# Patient Record
Sex: FEMALE | Race: WHITE | Employment: FULL TIME | URBAN - METROPOLITAN AREA
[De-identification: names, ages, dates, MRNs, and addresses within clinical notes are randomized per-mention and may not be internally consistent; named-entity substitution may affect disease eponyms.]

---

## 2019-04-02 ENCOUNTER — HOSPITAL ENCOUNTER (EMERGENCY)
Facility: CLINIC | Age: 49
Discharge: HOME | End: 2019-04-02
Attending: EMERGENCY MEDICINE | Admitting: EMERGENCY MEDICINE
Payer: COMMERCIAL

## 2019-04-02 ENCOUNTER — APPOINTMENT (OUTPATIENT)
Dept: CT IMAGING | Facility: CLINIC | Age: 49
End: 2019-04-02
Attending: EMERGENCY MEDICINE
Payer: COMMERCIAL

## 2019-04-02 VITALS
DIASTOLIC BLOOD PRESSURE: 69 MMHG | SYSTOLIC BLOOD PRESSURE: 112 MMHG | RESPIRATION RATE: 18 BRPM | BODY MASS INDEX: 23.9 KG/M2 | WEIGHT: 140 LBS | HEART RATE: 84 BPM | TEMPERATURE: 98.1 F | HEIGHT: 64 IN | OXYGEN SATURATION: 99 %

## 2019-04-02 DIAGNOSIS — N20.1 URETERAL STONE: ICD-10-CM

## 2019-04-02 LAB
ALBUMIN SERPL-MCNC: 4.1 G/DL (ref 3.4–5)
ALBUMIN UR-MCNC: 30 MG/DL
ALP SERPL-CCNC: 63 U/L (ref 40–150)
ALT SERPL W P-5'-P-CCNC: 13 U/L (ref 0–50)
ANION GAP SERPL CALCULATED.3IONS-SCNC: 7 MMOL/L (ref 3–14)
APPEARANCE UR: ABNORMAL
AST SERPL W P-5'-P-CCNC: 15 U/L (ref 0–45)
BASOPHILS # BLD AUTO: 0 10E9/L (ref 0–0.2)
BASOPHILS NFR BLD AUTO: 0.2 %
BILIRUB SERPL-MCNC: 0.3 MG/DL (ref 0.2–1.3)
BILIRUB UR QL STRIP: NEGATIVE
BUN SERPL-MCNC: 13 MG/DL (ref 7–30)
CALCIUM SERPL-MCNC: 8.9 MG/DL (ref 8.5–10.1)
CHLORIDE SERPL-SCNC: 104 MMOL/L (ref 94–109)
CO2 SERPL-SCNC: 25 MMOL/L (ref 20–32)
COLOR UR AUTO: YELLOW
CREAT SERPL-MCNC: 0.69 MG/DL (ref 0.52–1.04)
DIFFERENTIAL METHOD BLD: ABNORMAL
EOSINOPHIL # BLD AUTO: 0 10E9/L (ref 0–0.7)
EOSINOPHIL NFR BLD AUTO: 0.1 %
ERYTHROCYTE [DISTWIDTH] IN BLOOD BY AUTOMATED COUNT: 12.7 % (ref 10–15)
GFR SERPL CREATININE-BSD FRML MDRD: >90 ML/MIN/{1.73_M2}
GLUCOSE SERPL-MCNC: 116 MG/DL (ref 70–99)
GLUCOSE UR STRIP-MCNC: NEGATIVE MG/DL
HCG SERPL QL: NEGATIVE
HCT VFR BLD AUTO: 39.7 % (ref 35–47)
HGB BLD-MCNC: 13.2 G/DL (ref 11.7–15.7)
HGB UR QL STRIP: ABNORMAL
IMM GRANULOCYTES # BLD: 0 10E9/L (ref 0–0.4)
IMM GRANULOCYTES NFR BLD: 0.2 %
KETONES UR STRIP-MCNC: 10 MG/DL
LEUKOCYTE ESTERASE UR QL STRIP: ABNORMAL
LIPASE SERPL-CCNC: 159 U/L (ref 73–393)
LYMPHOCYTES # BLD AUTO: 1.1 10E9/L (ref 0.8–5.3)
LYMPHOCYTES NFR BLD AUTO: 8.4 %
MCH RBC QN AUTO: 27.6 PG (ref 26.5–33)
MCHC RBC AUTO-ENTMCNC: 33.2 G/DL (ref 31.5–36.5)
MCV RBC AUTO: 83 FL (ref 78–100)
MONOCYTES # BLD AUTO: 0.3 10E9/L (ref 0–1.3)
MONOCYTES NFR BLD AUTO: 2.7 %
MUCOUS THREADS #/AREA URNS LPF: PRESENT /LPF
NEUTROPHILS # BLD AUTO: 11.1 10E9/L (ref 1.6–8.3)
NEUTROPHILS NFR BLD AUTO: 88.4 %
NITRATE UR QL: NEGATIVE
NRBC # BLD AUTO: 0 10*3/UL
NRBC BLD AUTO-RTO: 0 /100
PH UR STRIP: 7.5 PH (ref 5–7)
PLATELET # BLD AUTO: 343 10E9/L (ref 150–450)
POTASSIUM SERPL-SCNC: 3.3 MMOL/L (ref 3.4–5.3)
PROT SERPL-MCNC: 7.6 G/DL (ref 6.8–8.8)
RBC # BLD AUTO: 4.79 10E12/L (ref 3.8–5.2)
RBC #/AREA URNS AUTO: >182 /HPF (ref 0–2)
SODIUM SERPL-SCNC: 136 MMOL/L (ref 133–144)
SOURCE: ABNORMAL
SP GR UR STRIP: 1.02 (ref 1–1.03)
SQUAMOUS #/AREA URNS AUTO: 2 /HPF (ref 0–1)
UROBILINOGEN UR STRIP-MCNC: NORMAL MG/DL (ref 0–2)
WBC # BLD AUTO: 12.6 10E9/L (ref 4–11)
WBC #/AREA URNS AUTO: 21 /HPF (ref 0–5)

## 2019-04-02 PROCEDURE — 81001 URINALYSIS AUTO W/SCOPE: CPT | Performed by: EMERGENCY MEDICINE

## 2019-04-02 PROCEDURE — 83690 ASSAY OF LIPASE: CPT | Performed by: EMERGENCY MEDICINE

## 2019-04-02 PROCEDURE — 85025 COMPLETE CBC W/AUTO DIFF WBC: CPT | Performed by: EMERGENCY MEDICINE

## 2019-04-02 PROCEDURE — 96361 HYDRATE IV INFUSION ADD-ON: CPT

## 2019-04-02 PROCEDURE — 96374 THER/PROPH/DIAG INJ IV PUSH: CPT

## 2019-04-02 PROCEDURE — 25000128 H RX IP 250 OP 636: Performed by: EMERGENCY MEDICINE

## 2019-04-02 PROCEDURE — 80053 COMPREHEN METABOLIC PANEL: CPT | Performed by: EMERGENCY MEDICINE

## 2019-04-02 PROCEDURE — 84703 CHORIONIC GONADOTROPIN ASSAY: CPT | Performed by: EMERGENCY MEDICINE

## 2019-04-02 PROCEDURE — 74176 CT ABD & PELVIS W/O CONTRAST: CPT

## 2019-04-02 PROCEDURE — 96376 TX/PRO/DX INJ SAME DRUG ADON: CPT

## 2019-04-02 PROCEDURE — 96375 TX/PRO/DX INJ NEW DRUG ADDON: CPT

## 2019-04-02 PROCEDURE — 99285 EMERGENCY DEPT VISIT HI MDM: CPT | Mod: 25

## 2019-04-02 RX ORDER — ONDANSETRON 2 MG/ML
4 INJECTION INTRAMUSCULAR; INTRAVENOUS EVERY 30 MIN PRN
Status: DISCONTINUED | OUTPATIENT
Start: 2019-04-02 | End: 2019-04-02 | Stop reason: HOSPADM

## 2019-04-02 RX ORDER — MORPHINE SULFATE 4 MG/ML
4 INJECTION, SOLUTION INTRAMUSCULAR; INTRAVENOUS
Status: DISCONTINUED | OUTPATIENT
Start: 2019-04-02 | End: 2019-04-02 | Stop reason: HOSPADM

## 2019-04-02 RX ORDER — OXYCODONE HYDROCHLORIDE 5 MG/1
5-10 TABLET ORAL EVERY 6 HOURS PRN
Qty: 20 TABLET | Refills: 0 | Status: SHIPPED | OUTPATIENT
Start: 2019-04-02 | End: 2019-05-17

## 2019-04-02 RX ORDER — SENNA AND DOCUSATE SODIUM 50; 8.6 MG/1; MG/1
1-2 TABLET, FILM COATED ORAL 2 TIMES DAILY PRN
Qty: 30 TABLET | Refills: 0 | Status: SHIPPED | OUTPATIENT
Start: 2019-04-02 | End: 2019-05-17

## 2019-04-02 RX ORDER — ONDANSETRON 4 MG/1
4 TABLET, ORALLY DISINTEGRATING ORAL EVERY 6 HOURS PRN
Qty: 20 TABLET | Refills: 0 | Status: SHIPPED | OUTPATIENT
Start: 2019-04-02 | End: 2019-05-17

## 2019-04-02 RX ORDER — KETOROLAC TROMETHAMINE 15 MG/ML
15 INJECTION, SOLUTION INTRAMUSCULAR; INTRAVENOUS ONCE
Status: COMPLETED | OUTPATIENT
Start: 2019-04-02 | End: 2019-04-02

## 2019-04-02 RX ORDER — TAMSULOSIN HYDROCHLORIDE 0.4 MG/1
0.4 CAPSULE ORAL DAILY
Qty: 10 CAPSULE | Refills: 0 | Status: SHIPPED | OUTPATIENT
Start: 2019-04-02 | End: 2019-04-05

## 2019-04-02 RX ADMIN — MORPHINE SULFATE 4 MG: 4 INJECTION INTRAVENOUS at 14:33

## 2019-04-02 RX ADMIN — ONDANSETRON 4 MG: 2 INJECTION INTRAMUSCULAR; INTRAVENOUS at 14:31

## 2019-04-02 RX ADMIN — SODIUM CHLORIDE 1000 ML: 9 INJECTION, SOLUTION INTRAVENOUS at 13:09

## 2019-04-02 RX ADMIN — KETOROLAC TROMETHAMINE 15 MG: 15 INJECTION, SOLUTION INTRAMUSCULAR; INTRAVENOUS at 13:10

## 2019-04-02 RX ADMIN — ONDANSETRON 4 MG: 2 INJECTION INTRAMUSCULAR; INTRAVENOUS at 13:43

## 2019-04-02 ASSESSMENT — ENCOUNTER SYMPTOMS
VOMITING: 0
SHORTNESS OF BREATH: 0
CHILLS: 0
BACK PAIN: 1
ABDOMINAL PAIN: 1
NAUSEA: 0
FREQUENCY: 0
FEVER: 0
CONSTIPATION: 0
HEMATURIA: 0
DIARRHEA: 1
DYSURIA: 0
DIFFICULTY URINATING: 0

## 2019-04-02 ASSESSMENT — MIFFLIN-ST. JEOR: SCORE: 1260.04

## 2019-04-02 NOTE — ED AVS SNAPSHOT
Emergency Department  [address] [facility] [address]  Phone:  [phone]  Fax:  [phone]                                    Mel Reeves   MRN: [id number]    Department:   Emergency Department   Date of Visit:  4/2/2019           After Visit Summary Signature Page    I have received my discharge instructions, and my questions have been answered. I have discussed any challenges I see with this plan with the nurse or doctor.    ..........................................................................................................................................  Patient/Patient Representative Signature      ..........................................................................................................................................  Patient Representative Print Name and Relationship to Patient    ..................................................               ................................................  Date                                   Time    ..........................................................................................................................................  Reviewed by Signature/Title    ...................................................              ..............................................  Date                                               Time          22EPIC Rev 08/18

## 2019-04-02 NOTE — DISCHARGE INSTRUCTIONS
1. Please take flomax as prescribed.  2. Take ibuprofen 600 to 800 mg by mouth every 6 to 8 hours as needed for pain or fever  3. Take acetaminophen 500 to 1000 mg by mouth every 4 to 6 hours as needed for pain or fever.  Do not take more than 4000 mg in 24 hours.  Do not take within 6 hours of another acetaminophen containing medication such as norco (vicodin) or percocet.  4. Take oxycodone as prescribed as needed for breakthrough pain.  5. Please follow-up with your primary care doctor or Urology as needed.  6. Please return to the ED as needed for new or worsening symptoms such as fever greater than 100.4 F, severe and uncontrollable pain vomiting and unable to keep anything down, any other worrisome symptoms.    Discharge Instructions  Kidney Stones    Kidney stones are a common problem that can cause a lot of pain but fortunately are usually not dangerous. Kidney stones form in the kidney and then can cause a blockage (obstruction) of the flow of urine from the kidney which leads to pain. Most patients can manage kidney stones at home (without a hospital stay).  However, sometimes your condition may be worse than it seemed at first, or may get worse with time. Most kidney stones will pass on their own, but occasionally stones may need to be removed by an urologist.    Generally, every Emergency Department visit should have a follow-up clinic visit with either a primary or a specialty clinic/provider. Please follow-up as instructed by your emergency provider today.      Return to the Emergency Department if:  Your pain is not controlled despite the medications provided or recommended.  You are vomiting (throwing up) and cannot keep fluids or medications down.  You develop a fever (>100.4 F).  You feel much more ill or develop new symptoms.  What can I do to help myself?  Be sure to drink plenty of fluids.  If instructed to do so, strain your urine (pee) with the urine strainer you were provided with today. Your  stone may look like a grain of sand or a small pebble. Collect any stones in the cup provided and bring to your follow-up appointment.  Staying active is good, and may help the stone to pass. You may do whatever you feel up to doing without restrictions.   Treatment:  Non-steroidal anti-inflammatory drugs (NSAIDs). This includes prescription medicines like Toradol  (ketorolac) and non-prescription medicines like Advil  (ibuprofen) and Nuprin  (ibuprofen) and Naproxen. These pain relievers are very effective for kidney stones.  Nausea (sick to your stomach) medication.  Nausea and vomiting are common with kidney stones, so your provider may send you home with medicine for this.   Flomax  (tamsulosin). This medicine is sometimes used for men with prostate problems, but also can help kidney stones to pass. Its effectiveness is controversial or questionable so it is prescribed in certain situations. This medicine can lower blood pressure, and you may feel faint/lightheaded, especially when you first stand up. Be sure to get up gradually, sit down if you feel faint, and avoid activity where feeling faint would be dangerous, such as climbing ladders.  If you were given a prescription for medicine here today, be sure to read all of the information (including the package insert) that comes with your prescription.  This will include important information about the medicine, its side effects, and any warnings that you need to know about.  The pharmacist who fills the prescription can provide more information and answer questions you may have about the medicine.  If you have questions or concerns that the pharmacist cannot address, please call or return to the Emergency Department.   Remember that you can always come back to the Emergency Department if you are not able to see your regular provider in the amount of time listed above, if you get any new symptoms, or if there is anything that worries you.

## 2019-04-02 NOTE — ED PROVIDER NOTES
History     Chief Complaint:  Abdominal pain    HPI   Mel Reeves is a 48 year old female status post appendectomy with a history of kidney stones and ovarian cysts who presents with right lower quadrant abdominal pain. The patient states that over the past several days she has been experiencing a very mild discomfort in her right lower quadrant of her abdomen. She has had some diarrhea, around ~1-2 bouts a day, but otherwise has been eating well without problems. Today, however, around 0900 the patient states this pain got suddenly and significantly worse. The pain likewise seemed to be wrapping around into her right lower back as well. The patient attempted hot packs as well as 800 mg Ibuprofen but given the lack of improvement over the past several hours she presents here for evaluation. The patient notes that since arrival, her pain has been improving but is still worse than it has been over the past several days. She has not had any black/bloody stools. She otherwise denies fevers, chills, urinary changes, hematuria, vaginal bleeding/pain, chest pain, shortness of breath. She has not had any recent travel nor antibiotic use.     LMP: 10 days ago    Allergies:  Sulfa Drugs      Medications:    The patient is currently on no regular medications.     Past Medical History:    Ovarian cyst  Kidney stone  Osteogenesis imperfecta     Past Surgical History:    Appendectomy    Family History:    History reviewed. No pertinent family history.      Social History:  Smoking Status: Never Smoker  Alcohol Use: Rarely  Patient presents with , Jayesh.      Review of Systems   Constitutional: Negative for chills and fever.   Respiratory: Negative for shortness of breath.    Cardiovascular: Negative for chest pain.   Gastrointestinal: Positive for abdominal pain and diarrhea. Negative for constipation, nausea and vomiting.   Genitourinary: Negative for difficulty urinating, dysuria, frequency, hematuria, vaginal  "bleeding and vaginal pain.   Musculoskeletal: Positive for back pain.   All other systems reviewed and are negative.      Physical Exam     Patient Vitals for the past 24 hrs:   BP Temp Pulse Heart Rate Resp SpO2 Height Weight   04/02/19 1500 -- -- -- 85 18 99 % -- --   04/02/19 1430 112/69 -- 84 -- 16 99 % -- --   04/02/19 1222 130/64 98.1  F (36.7  C) 109 -- 16 100 % 1.626 m (5' 4\") 63.5 kg (140 lb)        Physical Exam  Constitutional: Well developed, nontox appearance, appears somewhat uncomfortable   Head: Atraumatic.   Mouth/Throat: Oropharynx is clear and moist.   Neck:  no stridor  Eyes: no scleral icterus  Cardiovascular: RRR, 2+ bilat radial, DP pulses  Pulmonary/Chest: nml resp effort, Clear BS bilat  Abdominal: ND, +BS, soft, NT, no rebound or guarding   : R CVA tenderness  Ext: Warm, well perfused, no edema  Neurological: A&O, symmetric facies, moves ext x4  Skin: Skin is warm and dry.   Psychiatric: Behavior is normal. Thought content normal.   Nursing note and vitals reviewed.        Emergency Department Course     Imaging:  Radiographic findings were communicated with the patient who voiced understanding of the findings.    CT-scan Abdomen/Pelvis w/o contrast:  1. 6 x 4 mm obstructing calculus distal right ureter with hydroureter  and hydronephrosis.  2. Multiple nonobstructing calculi left renal collecting system,  including a staghorn calculus.  Preliminary result per radiology.      Laboratory:  CBC: WBC 12.6 (H), o/w WNL (HGB 13.2, )   CMP: K 3.3 (L), Glucose 116 (H), o/w WNL (Creatinine 0.69)   Lipase: 159  UA: Ketone 10, Large blood, pH 7.5 (H), Albumin 30, leukocyte esterase large, WBC 21 (H), RBC >182 (H), Squamous Epithelial 2 (H), mucous present, o/w negative  HCG: Negative    Interventions:  1310 - Toradol 15 mg IV  1343 - Zofran 4mg IV injection    1431 - Zofran 4mg IV injection    1433 - Morphine 4 mg IV    Emergency Department Course:  Past medical records, nursing notes, and " vitals reviewed.  1252: I performed an exam of the patient and obtained history, as documented above.     IV inserted and blood drawn.    The patient was sent for a CT-scan while in the emergency department, findings above.      1425: I rechecked the patient. Findings and plan explained to the Patient. Patient discharged home with instructions regarding supportive care, medications, and reasons to return. The importance of close follow-up was reviewed.      Impression & Plan      Medical Decision Making:  Mel Reeves is a 48 year old female status post appendectomy with a history of ovarian cysts and kidney stones who presents with 3 days of right lower quadrant and back pain A broad differential diagnosis was considered including diverticulitis, ureterolithiasis, ulcer, hydronephrosis, pneumonia, rib fracture, UTI, pyelonephritis, ectopic, ovarian cyst or torsion and pregnancy amongst many other etiologies. I did pursue a work up as noted above with CT-scan and basic blood work, as well as UA. CT-scan confirms the presence of a 6 x 4 mm obstructing calculus distal right ureter with hydroureter and hydronephrosis.  Minimal leukocytosis and UA consistent with inflammatory changes and bleeding 2ndary to stone.  Doubt infected ureteral stone.  Doubt other aforementioned possible etiologies given diagnosis of ureteral stone.   Patient is hemodynamically stable in ED. Pain successfully managed.  Discussed attempt to pass stone without invasive procedure even though the stone is larger than 5 mm which the pt is agreeable to. Rx written as noted below for outpt mgmt.  At this time I feel the patient is safe for discharge.  Recommendations given regarding follow up with urology and return to the emergency department as needed for new or worsening symptoms.  Counseled on all results, diagnosis and disposition.  Pt and  understanding and agreeable to plan. Patient discharged in improved condition.        Diagnosis:     ICD-10-CM    1. Ureteral stone N20.1      Discharge Medications:     ondansetron 4 MG ODT tab  Commonly known as:  ZOFRAN ODT  4 mg, Oral, EVERY 6 HOURS PRN     oxyCODONE 5 MG tablet  Commonly known as:  ROXICODONE  5-10 mg, Oral, EVERY 6 HOURS PRN     SENNA-docusate sodium 8.6-50 MG tablet  Commonly known as:  SENNA S  1-2 tablets, Oral, 2 TIMES DAILY PRN     tamsulosin 0.4 MG capsule  Commonly known as:  FLOMAX  0.4 mg, Oral, DAILY            Jarod Luciano  4/2/2019    EMERGENCY DEPARTMENT  Jarod MIRZA, bev serving as a scribe at 12:52 PM on 4/2/2019 to document services personally performed by Kashmir Henao MD based on my observations and the provider's statements to me.       Kashmir Henao MD  04/03/19 0955

## 2019-04-04 DIAGNOSIS — N20.1 URETERAL STONE: Primary | ICD-10-CM

## 2019-04-05 ENCOUNTER — OFFICE VISIT (OUTPATIENT)
Dept: UROLOGY | Facility: CLINIC | Age: 49
End: 2019-04-05
Payer: COMMERCIAL

## 2019-04-05 VITALS
SYSTOLIC BLOOD PRESSURE: 116 MMHG | HEART RATE: 90 BPM | HEIGHT: 64 IN | BODY MASS INDEX: 23.9 KG/M2 | OXYGEN SATURATION: 100 % | DIASTOLIC BLOOD PRESSURE: 86 MMHG | WEIGHT: 140 LBS

## 2019-04-05 DIAGNOSIS — N20.1 URETERAL STONE: ICD-10-CM

## 2019-04-05 DIAGNOSIS — N20.1 RIGHT URETERAL STONE: Primary | ICD-10-CM

## 2019-04-05 DIAGNOSIS — N20.0 STAGHORN RENAL CALCULUS: ICD-10-CM

## 2019-04-05 LAB
ALBUMIN UR-MCNC: NEGATIVE MG/DL
APPEARANCE UR: CLEAR
BILIRUB UR QL STRIP: NEGATIVE
COLOR UR AUTO: YELLOW
GLUCOSE UR STRIP-MCNC: NEGATIVE MG/DL
HGB UR QL STRIP: ABNORMAL
KETONES UR STRIP-MCNC: NEGATIVE MG/DL
LEUKOCYTE ESTERASE UR QL STRIP: ABNORMAL
NITRATE UR QL: NEGATIVE
PH UR STRIP: 7 PH (ref 5–7)
SOURCE: ABNORMAL
SP GR UR STRIP: <=1.005 (ref 1–1.03)
UROBILINOGEN UR STRIP-ACNC: 0.2 EU/DL (ref 0.2–1)

## 2019-04-05 PROCEDURE — 99204 OFFICE O/P NEW MOD 45 MIN: CPT | Performed by: UROLOGY

## 2019-04-05 PROCEDURE — 81003 URINALYSIS AUTO W/O SCOPE: CPT | Performed by: UROLOGY

## 2019-04-05 RX ORDER — TAMSULOSIN HYDROCHLORIDE 0.4 MG/1
0.4 CAPSULE ORAL DAILY
Qty: 30 CAPSULE | Refills: 0 | Status: SHIPPED | OUTPATIENT
Start: 2019-04-05 | End: 2019-05-09

## 2019-04-05 ASSESSMENT — PAIN SCALES - GENERAL: PAINLEVEL: MILD PAIN (2)

## 2019-04-05 ASSESSMENT — MIFFLIN-ST. JEOR: SCORE: 1260.04

## 2019-04-05 NOTE — NURSING NOTE
"Chief Complaint   Patient presents with     Consult For     Ureteral stone     Initial /86 (BP Location: Left arm, Patient Position: Sitting, Cuff Size: Adult Regular)   Pulse 90   Ht 1.626 m (5' 4\")   Wt 63.5 kg (140 lb)   LMP 03/24/2019 (Approximate)   SpO2 100%   BMI 24.03 kg/m   Estimated body mass index is 24.03 kg/m  as calculated from the following:    Height as of this encounter: 1.626 m (5' 4\").    Weight as of this encounter: 63.5 kg (140 lb).  BP completed using cuff size:regular-left.    Abram BORJAS  [facility] April 5, 2019 4:09 PM  "

## 2019-04-05 NOTE — LETTER
4/5/2019       RE: Mel Reeves  [address] [facility] [address]     Dear Colleague,    Thank you for referring your patient, Mel Reeves, to the [facility] [place] [facility]. Please see a copy of my visit note below.    Urology Consult History and Physical  [place]  Name: Mel Reeves    MRN: [id number]   YOB: 1970       We were asked to see Mel Reeves at the request of ER follow up for evaluation and treatment of RIGHT ureteral stone and LEFT staghorn kidney stone.        Chief Complaint:   RIGHT ureteral stone and LEFT staghorn kidney stone    History is obtained from the patient. He  joined her for this visit.             History of Present Illness:   Mel Reeves is a 48 year old female who is being seen for evaluation of RIGHT ureteral stone and LEFT staghorn kidney stone.    4/2 presented to the ER Right flank and abd. She reports that this pain started a few days prior and gradually worsened. Pain is located in the RIGHT flank with radiation to the RIGHT lower quadrant. Pain is sharp, intermittent in nature. She denies associated fever, chills, nausea or vomiting.   CT was obtained which demonstrated a 5x6mm RIGHT distal / UVJ stone with mild hydronephrosis and a large LEFT renal staghorn calculus. She has been managing pain with Tylenol and Ibuprofen.     Has history of stones about 18 years ago during pregnancy for which she had a ureteral stent and then Ureteroscopy.     (4 or >)  Location: RIGHT ureter, LEFT kidney  Quality: 5x6mm distal ureteral stone  Severity: ER visit, mild hydronephrosis  Duration: ~1 week           Past Medical History:     Past Medical History:   Diagnosis Date     Osteogenesis imperfecta             Past Surgical History:     Past Surgical History:   Procedure Laterality Date     CYSTOSCOPY              Social History:     Social History     Tobacco Use     Smoking  "status: Never Smoker     Smokeless tobacco: Never Used   Substance Use Topics     Alcohol use: Yes       History   Smoking Status     Never Smoker   Smokeless Tobacco     Never Used            Family History:   History reviewed. No pertinent family history.           Allergies:     Allergies   Allergen Reactions     Sulfa Drugs             Medications:     Current Outpatient Medications   Medication Sig     Calcium Carbonate-Vit D-Min (CALCIUM 1200 PO)      Cholecalciferol (VITAMIN D PO)      tamsulosin (FLOMAX) 0.4 MG capsule Take 1 capsule (0.4 mg) by mouth daily for 10 doses     ondansetron (ZOFRAN ODT) 4 MG ODT tab Take 1 tablet (4 mg) by mouth every 6 hours as needed (Patient not taking: Reported on 4/5/2019)     oxyCODONE (ROXICODONE) 5 MG tablet Take 1-2 tablets (5-10 mg) by mouth every 6 hours as needed for pain (Patient not taking: Reported on 4/5/2019)     SENNA-docusate sodium (SENNA S) 8.6-50 MG tablet Take 1-2 tablets by mouth 2 times daily as needed (if taking oxycodone) (Patient not taking: Reported on 4/5/2019)     No current facility-administered medications for this visit.              Review of Systems:     Skin: negative  Eyes: negative  Ears/Nose/Throat: negative  Respiratory: No shortness of breath, dyspnea on exertion, cough, or hemoptysis  Cardiovascular: negative  Gastrointestinal: negative  Genitourinary: as above  Musculoskeletal: negative  Neurologic: negative  Psychiatric: negative  Hematologic/Lymphatic/Immunologic: negative  Endocrine: negative          Physical Exam:     Patient Vitals for the past 24 hrs:   BP Pulse SpO2 Height Weight   04/05/19 1606 116/86 90 100 % 1.626 m (5' 4\") 63.5 kg (140 lb)     Body mass index is 24.03 kg/m .     General: age-appropriate appearing female in NAD  HEENT: Head AT/NC, EOMI, CN Grossly intact  Lungs: no respiratory distress, or pursed lip breathing  Heart: No obvious jugular venous distension present  Back: no bony midline tenderness, no CVAT " bilaterally.  Abdomen: soft, non-distended, non-tender. No organomegaly  : No costovertebral tenderness bilaterally   Lymph: no palpable inguinal lymphadenopathy.  LE: no edema.   Musculoskeltal: extremities normal, no peripheral edema  Skin: no suspicious lesions or rashes  Neuro: Alert, oriented, speech and mentation normal;  moving all 4 extremities equally.  Psych: affect and mood normal          Data:   All laboratory data reviewed:    UA RESULTS:  Recent Labs   Lab Test 04/05/19  1556 04/02/19  1245   COLOR Yellow Yellow   APPEARANCE Clear Slightly Cloudy   URINEGLC Negative Negative   URINEBILI Negative Negative   URINEKETONE Negative 10*   SG <=1.005 1.019   UBLD Small* Large*   URINEPH 7.0 7.5*   PROTEIN Negative 30*   UROBILINOGEN 0.2  --    NITRITE Negative Negative   LEUKEST Small* Moderate*   RBCU  --  >182*   WBCU  --  21*      Lab Results   Component Value Date    CR 0.69 04/02/2019        All pertinent imaging reviewed:    All imaging studies reviewed by me.  I personally reviewed these imaging films.    I reviewed the CT images with the patient and her .  I measure the ureteral stone at 5x6mm  A formal report from radiology will follow.    FINDINGS: There is an obstructing calculus just proximal to the right  ureteral orifice measuring 6 x 4 mm resulting in right hydroureter and  hydronephrosis without definite perinephric or periureteral soft  tissue stranding. No other right-sided urinary tract calculi. There  are multiple nonobstructing calculi in the lower pole of the left  kidney, including a staghorn calculus measuring up to 2.6 cm extending  into the left renal pelvis. No evidence for left-sided urinary tract  obstruction.     The liver, spleen, pancreas, bilateral adrenal glands and remainder of  the kidneys bilaterally are unremarkable without contrast. The bowel  and mesentery in the upper abdomen appear normal. Mild vascular  calcifications are seen.     Scans through the pelvis  show no acute abnormality or free fluid. The  appendix is not definitely identified but there is no CT evidence for  appendicitis.                                                                      IMPRESSION:  1. 6 x 4 mm obstructing calculus distal right ureter with hydroureter  and hydronephrosis.  2. Multiple nonobstructing calculi left renal collecting system,  including a staghorn calculus.                       Impression and Plan:   Impression:   48 year old female with recently diagnosed 5x6 mm RIGHT UVJ stone and LEFT staghorn renal stone.      Plan:   RIGHT 5x6mm UVJ stone  - We discussed treatment options which include:  ---- Medical Expulsive Therapy (MET): We discussed that given the stone size and location she would have a 40-50% with 4 weeks of Medical Expulsive Therapy (MET). We discussed that she should continue on tamsulosin 0.4mg (Flomax).  ---- URETEROSCOPY: we discussed that there would be 5-10% chance of requiring a staged procedure. We discussed the need for a ureteral stent.  - She will continue on Medical Expulsive Therapy (MET), however she would like to schedule Ureteroscopy in the very near future  - Orders for surgery placed    LEFT staghorn kidney stone  - We discussed that this stone is a very large stone filling up a large part of her LEFT renal collecting system. We discussed that this stone will require treatment in the near future, however given her lack of symptoms on this side, and her RIGHT ureteral stone, the RIGHT side requires treatment first.   - We discussed treatment options which include:  ---- ESWL: We discussed that this is NOT an option given the size of the stone  ---- URETEROSCOPY: we discussed that this is NOT an option given the size of the stone  ---- PCNL: we discussed a 95% chance of stone clearance with a single procedure, the need for an overnight stay, a ~5% chance of blood transfusion or serious infection. We discussed the need for a ureteral stent.  - We  will plan for this surgery following treatment of her RIGHT ureteral stone and will schedule on an elective basis         Thank you for the kind consultation.    Time spent: 30 minutes of which >50% was spent counseling.    Warner Ramirez MD   Urology  [facility]  [facility] Phone: [phone]  [facility] Phone: [phone]         Again, thank you for allowing me to participate in the care of your patient.      Sincerely,    Warner Ramirez MD

## 2019-04-05 NOTE — PROGRESS NOTES
"Urology Consult History and Physical  [id number]***  Name: Mel Reeves    MRN: [id number]   YOB: 1970       We were asked to see Mel Reeves at the request of  *** for evaluation and treatment of ***.        Chief Complaint:   ***    {   History obtained from                     :[id number]::\"History is obtained from the patient\"}            History of Present Illness:   Mel Reeves is a 48 year old female who is being seen for evaluation of ***    4/2 presented to the ER Right flank and abd  Has history of stones about 18 years ago during pregnancy for which she had a ureteral stent and then Ureteroscopy.     (4 or >)  Location: ***  Quality: ***  Severity: ***  Duration: ***  Timing: ***  Context: ***  Modifying factors: ***  Asso. Sign/symp: ***           Past Medical History:     Past Medical History:   Diagnosis Date     Osteogenesis imperfecta             Past Surgical History:     Past Surgical History:   Procedure Laterality Date     CYSTOSCOPY              Social History:     Social History     Tobacco Use     Smoking status: Never Smoker     Smokeless tobacco: Never Used   Substance Use Topics     Alcohol use: Yes       History   Smoking Status     Never Smoker   Smokeless Tobacco     Never Used            Family History:   History reviewed. No pertinent family history.           Allergies:     Allergies   Allergen Reactions     Sulfa Drugs             Medications:     Current Outpatient Medications   Medication Sig     Calcium Carbonate-Vit D-Min (CALCIUM 1200 PO)      Cholecalciferol (VITAMIN D PO)      tamsulosin (FLOMAX) 0.4 MG capsule Take 1 capsule (0.4 mg) by mouth daily for 10 doses     ondansetron (ZOFRAN ODT) 4 MG ODT tab Take 1 tablet (4 mg) by mouth every 6 hours as needed (Patient not taking: Reported on 4/5/2019)     oxyCODONE (ROXICODONE) 5 MG tablet Take 1-2 tablets (5-10 mg) by mouth every 6 hours as needed for pain (Patient not taking: Reported " "on 4/5/2019)     SENNA-docusate sodium (SENNA S) 8.6-50 MG tablet Take 1-2 tablets by mouth 2 times daily as needed (if taking oxycodone) (Patient not taking: Reported on 4/5/2019)     No current facility-administered medications for this visit.              Review of Systems:     Skin: {Skin:100::\"negative\"}  Eyes: {Eyes:200::\"negative\"}  Ears/Nose/Throat: {ENT:300::\"negative\"}  Respiratory: {Resp:400::\"No shortness of breath, dyspnea on exertion, cough, or hemoptysis\"}  Cardiovascular: {CV:500::\"negative\"}  Gastrointestinal: {GI:600::\"negative\"}  Genitourinary: {:700::\"negative\"}  Musculoskeletal: {Musc:800::\"negative\"}  Neurologic: {Neur:900::\"negative\"}  Psychiatric: {Psych:1000::\"negative\"}  Hematologic/Lymphatic/Immunologic: {Hem:1100::\"negative\"}  Endocrine: {Endo:1200::\"negative\"}          Physical Exam:     Patient Vitals for the past 24 hrs:   BP Pulse SpO2 Height Weight   04/05/19 1606 116/86 90 100 % 1.626 m (5' 4\") 63.5 kg (140 lb)     Body mass index is 24.03 kg/m .     General: age-appropriate appearing female in NAD  HEENT: Head AT/NC, EOMI, CN Grossly intact  Lungs: no respiratory distress, or pursed lip breathing  Heart: No obvious jugular venous distension present  Back: no bony midline tenderness, no CVAT bilaterally.  Abdomen: soft, ***-distended, ***-tender. No organomegaly  : normal ***male external genitalia.   Rectal: ***  Lymph: no palpable inguinal lymphadenopathy.  LE: *** edema.   Musculoskeltal: extremities normal, no peripheral edema  Skin: no suspicious lesions or rashes  Neuro: Alert, oriented, speech and mentation normal;  *** moving all 4 extremities equally.  Psych: affect and mood normal          Data:   All laboratory data reviewed:    UA RESULTS:  Recent Labs   Lab Test 04/05/19  1556 04/02/19  1245   COLOR Yellow Yellow   APPEARANCE Clear Slightly Cloudy   URINEGLC Negative Negative   URINEBILI Negative Negative   URINEKETONE Negative 10*   SG <=1.005 1.019   UBLD Small* " Large*   URINEPH 7.0 7.5*   PROTEIN Negative 30*   UROBILINOGEN 0.2  --    NITRITE Negative Negative   LEUKEST Small* Moderate*   RBCU  --  >182*   WBCU  --  21*      No results found for: PSA   Lab Results   Component Value Date    CR 0.69 04/02/2019        All pertinent imaging reviewed:    {   Imaging                     :[id number]}                 Impression and Plan:   Impression:   ***      Plan:   ***     Thank you for the kind consultation.    Time spent: *** minutes of which >50% was spent counseling.    Warner Ramirez MD   Urology  [facility]  [facility] Phone: [phone]  [facility] Phone: [phone]

## 2019-04-05 NOTE — LETTER
4/5/2019      RE: Mel Reeves  [address] [address]       Dear Colleague,    Thank you for the opportunity to participate in the care of your patient, Mel Reeves, at the [facility] [place] at [facility]. Please see a copy of my visit note below.    Urology Consult History and Physical  [place]  Name: Mel Reeves    MRN: [id number]   YOB: 1970       We were asked to see Mel Reeves at the request of ER follow up for evaluation and treatment of RIGHT ureteral stone and LEFT staghorn kidney stone.          Chief Complaint:   RIGHT ureteral stone and LEFT staghorn kidney stone    History is obtained from the patient. He  joined her for this visit.           History of Present Illness:   Mel Reeves is a 48 year old female who is being seen for evaluation of RIGHT ureteral stone and LEFT staghorn kidney stone.    4/2 presented to the ER Right flank and abd. She reports that this pain started a few days prior and gradually worsened. Pain is located in the RIGHT flank with radiation to the RIGHT lower quadrant. Pain is sharp, intermittent in nature. She denies associated fever, chills, nausea or vomiting.   CT was obtained which demonstrated a 5x6mm RIGHT distal / UVJ stone with mild hydronephrosis and a large LEFT renal staghorn calculus. She has been managing pain with Tylenol and Ibuprofen.     Has history of stones about 18 years ago during pregnancy for which she had a ureteral stent and then Ureteroscopy.     (4 or >)  Location: RIGHT ureter, LEFT kidney  Quality: 5x6mm distal ureteral stone  Severity: ER visit, mild hydronephrosis  Duration: ~1 week           Past Medical History:     Past Medical History:   Diagnosis Date     Osteogenesis imperfecta             Past Surgical History:     Past Surgical History:   Procedure Laterality Date     CYSTOSCOPY              Social History:     Social  "History     Tobacco Use     Smoking status: Never Smoker     Smokeless tobacco: Never Used   Substance Use Topics     Alcohol use: Yes       History   Smoking Status     Never Smoker   Smokeless Tobacco     Never Used            Family History:   History reviewed. No pertinent family history.           Allergies:     Allergies   Allergen Reactions     Sulfa Drugs             Medications:     Current Outpatient Medications   Medication Sig     Calcium Carbonate-Vit D-Min (CALCIUM 1200 PO)      Cholecalciferol (VITAMIN D PO)      tamsulosin (FLOMAX) 0.4 MG capsule Take 1 capsule (0.4 mg) by mouth daily for 10 doses     ondansetron (ZOFRAN ODT) 4 MG ODT tab Take 1 tablet (4 mg) by mouth every 6 hours as needed (Patient not taking: Reported on 4/5/2019)     oxyCODONE (ROXICODONE) 5 MG tablet Take 1-2 tablets (5-10 mg) by mouth every 6 hours as needed for pain (Patient not taking: Reported on 4/5/2019)     SENNA-docusate sodium (SENNA S) 8.6-50 MG tablet Take 1-2 tablets by mouth 2 times daily as needed (if taking oxycodone) (Patient not taking: Reported on 4/5/2019)     No current facility-administered medications for this visit.              Review of Systems:     Skin: negative  Eyes: negative  Ears/Nose/Throat: negative  Respiratory: No shortness of breath, dyspnea on exertion, cough, or hemoptysis  Cardiovascular: negative  Gastrointestinal: negative  Genitourinary: as above  Musculoskeletal: negative  Neurologic: negative  Psychiatric: negative  Hematologic/Lymphatic/Immunologic: negative  Endocrine: negative          Physical Exam:     Patient Vitals for the past 24 hrs:   BP Pulse SpO2 Height Weight   04/05/19 1606 116/86 90 100 % 1.626 m (5' 4\") 63.5 kg (140 lb)     Body mass index is 24.03 kg/m .     General: age-appropriate appearing female in NAD  HEENT: Head AT/NC, EOMI, CN Grossly intact  Lungs: no respiratory distress, or pursed lip breathing  Heart: No obvious jugular venous distension present  Back: no " bony midline tenderness, no CVAT bilaterally.  Abdomen: soft, non-distended, non-tender. No organomegaly  : No costovertebral tenderness bilaterally   Lymph: no palpable inguinal lymphadenopathy.  LE: no edema.   Musculoskeltal: extremities normal, no peripheral edema  Skin: no suspicious lesions or rashes  Neuro: Alert, oriented, speech and mentation normal;  moving all 4 extremities equally.  Psych: affect and mood normal          Data:   All laboratory data reviewed:    UA RESULTS:  Recent Labs   Lab Test 04/05/19  1556 04/02/19  1245   COLOR Yellow Yellow   APPEARANCE Clear Slightly Cloudy   URINEGLC Negative Negative   URINEBILI Negative Negative   URINEKETONE Negative 10*   SG <=1.005 1.019   UBLD Small* Large*   URINEPH 7.0 7.5*   PROTEIN Negative 30*   UROBILINOGEN 0.2  --    NITRITE Negative Negative   LEUKEST Small* Moderate*   RBCU  --  >182*   WBCU  --  21*      Lab Results   Component Value Date    CR 0.69 04/02/2019        All pertinent imaging reviewed:    All imaging studies reviewed by me.  I personally reviewed these imaging films.    I reviewed the CT images with the patient and her .  I measure the ureteral stone at 5x6mm  A formal report from radiology will follow.    FINDINGS: There is an obstructing calculus just proximal to the right  ureteral orifice measuring 6 x 4 mm resulting in right hydroureter and  hydronephrosis without definite perinephric or periureteral soft  tissue stranding. No other right-sided urinary tract calculi. There  are multiple nonobstructing calculi in the lower pole of the left  kidney, including a staghorn calculus measuring up to 2.6 cm extending  into the left renal pelvis. No evidence for left-sided urinary tract  obstruction.     The liver, spleen, pancreas, bilateral adrenal glands and remainder of  the kidneys bilaterally are unremarkable without contrast. The bowel  and mesentery in the upper abdomen appear normal. Mild vascular  calcifications are  seen.     Scans through the pelvis show no acute abnormality or free fluid. The  appendix is not definitely identified but there is no CT evidence for  appendicitis.                                                                      IMPRESSION:  1. 6 x 4 mm obstructing calculus distal right ureter with hydroureter  and hydronephrosis.  2. Multiple nonobstructing calculi left renal collecting system,  including a staghorn calculus.                       Impression and Plan:   Impression:   48 year old female with recently diagnosed 5x6 mm RIGHT UVJ stone and LEFT staghorn renal stone.      Plan:   RIGHT 5x6mm UVJ stone  - We discussed treatment options which include:  ---- Medical Expulsive Therapy (MET): We discussed that given the stone size and location she would have a 40-50% with 4 weeks of Medical Expulsive Therapy (MET). We discussed that she should continue on tamsulosin 0.4mg (Flomax).  ---- URETEROSCOPY: we discussed that there would be 5-10% chance of requiring a staged procedure. We discussed the need for a ureteral stent.  - She will continue on Medical Expulsive Therapy (MET), however she would like to schedule Ureteroscopy in the very near future  - Orders for surgery placed    LEFT staghorn kidney stone  - We discussed that this stone is a very large stone filling up a large part of her LEFT renal collecting system. We discussed that this stone will require treatment in the near future, however given her lack of symptoms on this side, and her RIGHT ureteral stone, the RIGHT side requires treatment first.   - We discussed treatment options which include:  ---- ESWL: We discussed that this is NOT an option given the size of the stone  ---- URETEROSCOPY: we discussed that this is NOT an option given the size of the stone  ---- PCNL: we discussed a 95% chance of stone clearance with a single procedure, the need for an overnight stay, a ~5% chance of blood transfusion or serious infection. We discussed  the need for a ureteral stent.  - We will plan for this surgery following treatment of her RIGHT ureteral stone and will schedule on an elective basis         Thank you for the kind consultation.    Time spent: 30 minutes of which >50% was spent counseling.    Warner Ramirez MD   Urology  [facility]  [facility] Phone: [phone]  [facility] Phone: [phone]

## 2019-04-06 NOTE — PROGRESS NOTES
Urology Consult History and Physical  [facility]  Name: Mel Reeves    MRN: [id number]   YOB: 1970       We were asked to see Mel Reeves at the request of ER follow up for evaluation and treatment of RIGHT ureteral stone and LEFT staghorn kidney stone.        Chief Complaint:   RIGHT ureteral stone and LEFT staghorn kidney stone    History is obtained from the patient. He  joined her for this visit.             History of Present Illness:   Mel Reeves is a 48 year old female who is being seen for evaluation of RIGHT ureteral stone and LEFT staghorn kidney stone.    4/2 presented to the ER Right flank and abd. She reports that this pain started a few days prior and gradually worsened. Pain is located in the RIGHT flank with radiation to the RIGHT lower quadrant. Pain is sharp, intermittent in nature. She denies associated fever, chills, nausea or vomiting.   CT was obtained which demonstrated a 5x6mm RIGHT distal / UVJ stone with mild hydronephrosis and a large LEFT renal staghorn calculus. She has been managing pain with Tylenol and Ibuprofen.     Has history of stones about 18 years ago during pregnancy for which she had a ureteral stent and then Ureteroscopy.     (4 or >)  Location: RIGHT ureter, LEFT kidney  Quality: 5x6mm distal ureteral stone  Severity: ER visit, mild hydronephrosis  Duration: ~1 week           Past Medical History:     Past Medical History:   Diagnosis Date     Osteogenesis imperfecta             Past Surgical History:     Past Surgical History:   Procedure Laterality Date     CYSTOSCOPY              Social History:     Social History     Tobacco Use     Smoking status: Never Smoker     Smokeless tobacco: Never Used   Substance Use Topics     Alcohol use: Yes       History   Smoking Status     Never Smoker   Smokeless Tobacco     Never Used            Family History:   History reviewed. No pertinent family history.           Allergies:     Allergies   Allergen  "Reactions     Sulfa Drugs             Medications:     Current Outpatient Medications   Medication Sig     Calcium Carbonate-Vit D-Min (CALCIUM 1200 PO)      Cholecalciferol (VITAMIN D PO)      tamsulosin (FLOMAX) 0.4 MG capsule Take 1 capsule (0.4 mg) by mouth daily for 10 doses     ondansetron (ZOFRAN ODT) 4 MG ODT tab Take 1 tablet (4 mg) by mouth every 6 hours as needed (Patient not taking: Reported on 4/5/2019)     oxyCODONE (ROXICODONE) 5 MG tablet Take 1-2 tablets (5-10 mg) by mouth every 6 hours as needed for pain (Patient not taking: Reported on 4/5/2019)     SENNA-docusate sodium (SENNA S) 8.6-50 MG tablet Take 1-2 tablets by mouth 2 times daily as needed (if taking oxycodone) (Patient not taking: Reported on 4/5/2019)     No current facility-administered medications for this visit.              Review of Systems:     Skin: negative  Eyes: negative  Ears/Nose/Throat: negative  Respiratory: No shortness of breath, dyspnea on exertion, cough, or hemoptysis  Cardiovascular: negative  Gastrointestinal: negative  Genitourinary: as above  Musculoskeletal: negative  Neurologic: negative  Psychiatric: negative  Hematologic/Lymphatic/Immunologic: negative  Endocrine: negative          Physical Exam:     Patient Vitals for the past 24 hrs:   BP Pulse SpO2 Height Weight   04/05/19 1606 116/86 90 100 % 1.626 m (5' 4\") 63.5 kg (140 lb)     Body mass index is 24.03 kg/m .     General: age-appropriate appearing female in NAD  HEENT: Head AT/NC, EOMI, CN Grossly intact  Lungs: no respiratory distress, or pursed lip breathing  Heart: No obvious jugular venous distension present  Back: no bony midline tenderness, no CVAT bilaterally.  Abdomen: soft, non-distended, non-tender. No organomegaly  : No costovertebral tenderness bilaterally   Lymph: no palpable inguinal lymphadenopathy.  LE: no edema.   Musculoskeltal: extremities normal, no peripheral edema  Skin: no suspicious lesions or rashes  Neuro: Alert, oriented, speech " and mentation normal;  moving all 4 extremities equally.  Psych: affect and mood normal          Data:   All laboratory data reviewed:    UA RESULTS:  Recent Labs   Lab Test 04/05/19  1556 04/02/19  1245   COLOR Yellow Yellow   APPEARANCE Clear Slightly Cloudy   URINEGLC Negative Negative   URINEBILI Negative Negative   URINEKETONE Negative 10*   SG <=1.005 1.019   UBLD Small* Large*   URINEPH 7.0 7.5*   PROTEIN Negative 30*   UROBILINOGEN 0.2  --    NITRITE Negative Negative   LEUKEST Small* Moderate*   RBCU  --  >182*   WBCU  --  21*      Lab Results   Component Value Date    CR 0.69 04/02/2019        All pertinent imaging reviewed:    All imaging studies reviewed by me.  I personally reviewed these imaging films.    I reviewed the CT images with the patient and her .  I measure the ureteral stone at 5x6mm  A formal report from radiology will follow.    FINDINGS: There is an obstructing calculus just proximal to the right  ureteral orifice measuring 6 x 4 mm resulting in right hydroureter and  hydronephrosis without definite perinephric or periureteral soft  tissue stranding. No other right-sided urinary tract calculi. There  are multiple nonobstructing calculi in the lower pole of the left  kidney, including a staghorn calculus measuring up to 2.6 cm extending  into the left renal pelvis. No evidence for left-sided urinary tract  obstruction.     The liver, spleen, pancreas, bilateral adrenal glands and remainder of  the kidneys bilaterally are unremarkable without contrast. The bowel  and mesentery in the upper abdomen appear normal. Mild vascular  calcifications are seen.     Scans through the pelvis show no acute abnormality or free fluid. The  appendix is not definitely identified but there is no CT evidence for  appendicitis.                                                                      IMPRESSION:  1. 6 x 4 mm obstructing calculus distal right ureter with hydroureter  and hydronephrosis.  2.  Multiple nonobstructing calculi left renal collecting system,  including a staghorn calculus.                       Impression and Plan:   Impression:   48 year old female with recently diagnosed 5x6 mm RIGHT UVJ stone and LEFT staghorn renal stone.      Plan:   RIGHT 5x6mm UVJ stone  - We discussed treatment options which include:  ---- Medical Expulsive Therapy (MET): We discussed that given the stone size and location she would have a 40-50% with 4 weeks of Medical Expulsive Therapy (MET). We discussed that she should continue on tamsulosin 0.4mg (Flomax).  ---- URETEROSCOPY: we discussed that there would be 5-10% chance of requiring a staged procedure. We discussed the need for a ureteral stent.  - She will continue on Medical Expulsive Therapy (MET), however she would like to schedule Ureteroscopy in the very near future  - Orders for surgery placed    LEFT staghorn kidney stone  - We discussed that this stone is a very large stone filling up a large part of her LEFT renal collecting system. We discussed that this stone will require treatment in the near future, however given her lack of symptoms on this side, and her RIGHT ureteral stone, the RIGHT side requires treatment first.   - We discussed treatment options which include:  ---- ESWL: We discussed that this is NOT an option given the size of the stone  ---- URETEROSCOPY: we discussed that this is NOT an option given the size of the stone  ---- PCNL: we discussed a 95% chance of stone clearance with a single procedure, the need for an overnight stay, a ~5% chance of blood transfusion or serious infection. We discussed the need for a ureteral stent.  - We will plan for this surgery following treatment of her RIGHT ureteral stone and will schedule on an elective basis         Thank you for the kind consultation.    Time spent: 30 minutes of which >50% was spent counseling.    Warner Ramirez MD   Urology  [facility]  [facility] Phone: [phone]  [facility] Phone: [phone]

## 2019-04-08 ENCOUNTER — ANESTHESIA EVENT (OUTPATIENT)
Dept: SURGERY | Facility: CLINIC | Age: 49
End: 2019-04-08
Payer: COMMERCIAL

## 2019-04-08 ENCOUNTER — APPOINTMENT (OUTPATIENT)
Dept: GENERAL RADIOLOGY | Facility: CLINIC | Age: 49
End: 2019-04-08
Attending: UROLOGY
Payer: COMMERCIAL

## 2019-04-08 ENCOUNTER — ANESTHESIA (OUTPATIENT)
Dept: SURGERY | Facility: CLINIC | Age: 49
End: 2019-04-08
Payer: COMMERCIAL

## 2019-04-08 ENCOUNTER — HOSPITAL ENCOUNTER (OUTPATIENT)
Facility: CLINIC | Age: 49
Discharge: HOME | End: 2019-04-08
Attending: UROLOGY | Admitting: UROLOGY
Payer: COMMERCIAL

## 2019-04-08 VITALS
BODY MASS INDEX: 23.19 KG/M2 | RESPIRATION RATE: 14 BRPM | TEMPERATURE: 98.2 F | OXYGEN SATURATION: 100 % | HEART RATE: 81 BPM | DIASTOLIC BLOOD PRESSURE: 70 MMHG | HEIGHT: 65 IN | WEIGHT: 139.2 LBS | SYSTOLIC BLOOD PRESSURE: 115 MMHG

## 2019-04-08 DIAGNOSIS — N20.1 RIGHT URETERAL STONE: Primary | ICD-10-CM

## 2019-04-08 LAB — HCG UR QL: NEGATIVE

## 2019-04-08 PROCEDURE — 52356 CYSTO/URETERO W/LITHOTRIPSY: CPT | Mod: RT | Performed by: UROLOGY

## 2019-04-08 PROCEDURE — 27210794 ZZH OR GENERAL SUPPLY STERILE: Performed by: UROLOGY

## 2019-04-08 PROCEDURE — 25000128 H RX IP 250 OP 636: Performed by: NURSE ANESTHETIST, CERTIFIED REGISTERED

## 2019-04-08 PROCEDURE — 25800030 ZZH RX IP 258 OP 636: Performed by: NURSE ANESTHETIST, CERTIFIED REGISTERED

## 2019-04-08 PROCEDURE — 88300 SURGICAL PATH GROSS: CPT | Performed by: UROLOGY

## 2019-04-08 PROCEDURE — 36000056 ZZH SURGERY LEVEL 3 1ST 30 MIN: Performed by: UROLOGY

## 2019-04-08 PROCEDURE — 36000058 ZZH SURGERY LEVEL 3 EA 15 ADDTL MIN: Performed by: UROLOGY

## 2019-04-08 PROCEDURE — C1769 GUIDE WIRE: HCPCS | Performed by: UROLOGY

## 2019-04-08 PROCEDURE — 25000566 ZZH SEVOFLURANE, EA 15 MIN: Performed by: UROLOGY

## 2019-04-08 PROCEDURE — C1758 CATHETER, URETERAL: HCPCS | Performed by: UROLOGY

## 2019-04-08 PROCEDURE — 40000170 ZZH STATISTIC PRE-PROCEDURE ASSESSMENT II: Performed by: UROLOGY

## 2019-04-08 PROCEDURE — 74420 UROGRAPHY RTRGR +-KUB: CPT | Mod: 26 | Performed by: UROLOGY

## 2019-04-08 PROCEDURE — 37000009 ZZH ANESTHESIA TECHNICAL FEE, EACH ADDTL 15 MIN: Performed by: UROLOGY

## 2019-04-08 PROCEDURE — 82365 CALCULUS SPECTROSCOPY: CPT | Performed by: UROLOGY

## 2019-04-08 PROCEDURE — 88300 SURGICAL PATH GROSS: CPT | Mod: 26 | Performed by: UROLOGY

## 2019-04-08 PROCEDURE — 40000277 XR SURGERY CARM FLUORO LESS THAN 5 MIN W STILLS

## 2019-04-08 PROCEDURE — 71000012 ZZH RECOVERY PHASE 1 LEVEL 1 FIRST HR: Performed by: UROLOGY

## 2019-04-08 PROCEDURE — 37000008 ZZH ANESTHESIA TECHNICAL FEE, 1ST 30 MIN: Performed by: UROLOGY

## 2019-04-08 PROCEDURE — 25000125 ZZHC RX 250: Performed by: NURSE ANESTHETIST, CERTIFIED REGISTERED

## 2019-04-08 PROCEDURE — 81025 URINE PREGNANCY TEST: CPT | Performed by: ANESTHESIOLOGY

## 2019-04-08 PROCEDURE — 25000128 H RX IP 250 OP 636: Performed by: UROLOGY

## 2019-04-08 PROCEDURE — 71000027 ZZH RECOVERY PHASE 2 EACH 15 MINS: Performed by: UROLOGY

## 2019-04-08 PROCEDURE — 71000013 ZZH RECOVERY PHASE 1 LEVEL 1 EA ADDTL HR: Performed by: UROLOGY

## 2019-04-08 PROCEDURE — C2617 STENT, NON-COR, TEM W/O DEL: HCPCS | Performed by: UROLOGY

## 2019-04-08 DEVICE — STENT URETERAL CONTOUR SOFT PERCUFLEX 6FRX22CM: Type: IMPLANTABLE DEVICE | Site: URETER | Status: FUNCTIONAL

## 2019-04-08 RX ORDER — IBUPROFEN 600 MG/1
600 TABLET, FILM COATED ORAL EVERY 6 HOURS PRN
COMMUNITY
End: 2019-05-17

## 2019-04-08 RX ORDER — SODIUM CHLORIDE, SODIUM LACTATE, POTASSIUM CHLORIDE, CALCIUM CHLORIDE 600; 310; 30; 20 MG/100ML; MG/100ML; MG/100ML; MG/100ML
INJECTION, SOLUTION INTRAVENOUS CONTINUOUS PRN
Status: DISCONTINUED | OUTPATIENT
Start: 2019-04-08 | End: 2019-04-08

## 2019-04-08 RX ORDER — LIDOCAINE HYDROCHLORIDE 20 MG/ML
INJECTION, SOLUTION INFILTRATION; PERINEURAL PRN
Status: DISCONTINUED | OUTPATIENT
Start: 2019-04-08 | End: 2019-04-08

## 2019-04-08 RX ORDER — KETOROLAC TROMETHAMINE 30 MG/ML
INJECTION, SOLUTION INTRAMUSCULAR; INTRAVENOUS PRN
Status: DISCONTINUED | OUTPATIENT
Start: 2019-04-08 | End: 2019-04-08

## 2019-04-08 RX ORDER — SODIUM CHLORIDE, SODIUM LACTATE, POTASSIUM CHLORIDE, CALCIUM CHLORIDE 600; 310; 30; 20 MG/100ML; MG/100ML; MG/100ML; MG/100ML
INJECTION, SOLUTION INTRAVENOUS CONTINUOUS
Status: DISCONTINUED | OUTPATIENT
Start: 2019-04-08 | End: 2019-04-08 | Stop reason: HOSPADM

## 2019-04-08 RX ORDER — DIPHENHYDRAMINE HYDROCHLORIDE 50 MG/ML
INJECTION INTRAMUSCULAR; INTRAVENOUS PRN
Status: DISCONTINUED | OUTPATIENT
Start: 2019-04-08 | End: 2019-04-08

## 2019-04-08 RX ORDER — FENTANYL CITRATE 50 UG/ML
25-50 INJECTION, SOLUTION INTRAMUSCULAR; INTRAVENOUS
Status: DISCONTINUED | OUTPATIENT
Start: 2019-04-08 | End: 2019-04-08 | Stop reason: HOSPADM

## 2019-04-08 RX ORDER — ONDANSETRON 2 MG/ML
4 INJECTION INTRAMUSCULAR; INTRAVENOUS EVERY 30 MIN PRN
Status: DISCONTINUED | OUTPATIENT
Start: 2019-04-08 | End: 2019-04-08 | Stop reason: HOSPADM

## 2019-04-08 RX ORDER — KETOROLAC TROMETHAMINE 10 MG/1
10 TABLET, FILM COATED ORAL EVERY 6 HOURS
Qty: 20 TABLET | Refills: 0 | Status: SHIPPED | OUTPATIENT
Start: 2019-04-08 | End: 2019-05-17

## 2019-04-08 RX ORDER — FUROSEMIDE 10 MG/ML
INJECTION INTRAMUSCULAR; INTRAVENOUS PRN
Status: DISCONTINUED | OUTPATIENT
Start: 2019-04-08 | End: 2019-04-08

## 2019-04-08 RX ORDER — ONDANSETRON 2 MG/ML
INJECTION INTRAMUSCULAR; INTRAVENOUS PRN
Status: DISCONTINUED | OUTPATIENT
Start: 2019-04-08 | End: 2019-04-08

## 2019-04-08 RX ORDER — OXYBUTYNIN CHLORIDE 5 MG/1
5 TABLET, EXTENDED RELEASE ORAL DAILY
Qty: 10 TABLET | Refills: 0 | Status: SHIPPED | OUTPATIENT
Start: 2019-04-08 | End: 2019-05-17

## 2019-04-08 RX ORDER — ONDANSETRON 4 MG/1
4 TABLET, ORALLY DISINTEGRATING ORAL EVERY 30 MIN PRN
Status: DISCONTINUED | OUTPATIENT
Start: 2019-04-08 | End: 2019-04-08 | Stop reason: HOSPADM

## 2019-04-08 RX ORDER — CEFAZOLIN SODIUM 2 G/100ML
2 INJECTION, SOLUTION INTRAVENOUS
Status: COMPLETED | OUTPATIENT
Start: 2019-04-08 | End: 2019-04-08

## 2019-04-08 RX ORDER — PROPOFOL 10 MG/ML
INJECTION, EMULSION INTRAVENOUS PRN
Status: DISCONTINUED | OUTPATIENT
Start: 2019-04-08 | End: 2019-04-08

## 2019-04-08 RX ORDER — MEPERIDINE HYDROCHLORIDE 25 MG/ML
12.5 INJECTION INTRAMUSCULAR; INTRAVENOUS; SUBCUTANEOUS
Status: DISCONTINUED | OUTPATIENT
Start: 2019-04-08 | End: 2019-04-08 | Stop reason: HOSPADM

## 2019-04-08 RX ORDER — CEFAZOLIN SODIUM 1 G/3ML
1 INJECTION, POWDER, FOR SOLUTION INTRAMUSCULAR; INTRAVENOUS SEE ADMIN INSTRUCTIONS
Status: DISCONTINUED | OUTPATIENT
Start: 2019-04-08 | End: 2019-04-08 | Stop reason: HOSPADM

## 2019-04-08 RX ORDER — FENTANYL CITRATE 50 UG/ML
INJECTION, SOLUTION INTRAMUSCULAR; INTRAVENOUS PRN
Status: DISCONTINUED | OUTPATIENT
Start: 2019-04-08 | End: 2019-04-08

## 2019-04-08 RX ORDER — NALOXONE HYDROCHLORIDE 0.4 MG/ML
.1-.4 INJECTION, SOLUTION INTRAMUSCULAR; INTRAVENOUS; SUBCUTANEOUS
Status: DISCONTINUED | OUTPATIENT
Start: 2019-04-08 | End: 2019-04-08 | Stop reason: HOSPADM

## 2019-04-08 RX ORDER — PROPOFOL 10 MG/ML
INJECTION, EMULSION INTRAVENOUS CONTINUOUS PRN
Status: DISCONTINUED | OUTPATIENT
Start: 2019-04-08 | End: 2019-04-08

## 2019-04-08 RX ORDER — DEXAMETHASONE SODIUM PHOSPHATE 4 MG/ML
INJECTION, SOLUTION INTRA-ARTICULAR; INTRALESIONAL; INTRAMUSCULAR; INTRAVENOUS; SOFT TISSUE PRN
Status: DISCONTINUED | OUTPATIENT
Start: 2019-04-08 | End: 2019-04-08

## 2019-04-08 RX ADMIN — FENTANYL CITRATE 50 MCG: 50 INJECTION, SOLUTION INTRAMUSCULAR; INTRAVENOUS at 12:19

## 2019-04-08 RX ADMIN — FENTANYL CITRATE 50 MCG: 50 INJECTION, SOLUTION INTRAMUSCULAR; INTRAVENOUS at 12:25

## 2019-04-08 RX ADMIN — ONDANSETRON 4 MG: 2 INJECTION INTRAMUSCULAR; INTRAVENOUS at 12:13

## 2019-04-08 RX ADMIN — KETOROLAC TROMETHAMINE 30 MG: 30 INJECTION, SOLUTION INTRAMUSCULAR at 12:57

## 2019-04-08 RX ADMIN — DIPHENHYDRAMINE HYDROCHLORIDE 12.5 MG: 50 INJECTION, SOLUTION INTRAMUSCULAR; INTRAVENOUS at 12:32

## 2019-04-08 RX ADMIN — PROPOFOL 193 MG: 10 INJECTION, EMULSION INTRAVENOUS at 12:22

## 2019-04-08 RX ADMIN — SODIUM CHLORIDE, POTASSIUM CHLORIDE, SODIUM LACTATE AND CALCIUM CHLORIDE: 600; 310; 30; 20 INJECTION, SOLUTION INTRAVENOUS at 12:19

## 2019-04-08 RX ADMIN — PROPOFOL 100 MCG/KG/MIN: 10 INJECTION, EMULSION INTRAVENOUS at 12:22

## 2019-04-08 RX ADMIN — FUROSEMIDE 10 MG: 10 INJECTION, SOLUTION INTRAVENOUS at 12:57

## 2019-04-08 RX ADMIN — DEXAMETHASONE SODIUM PHOSPHATE 4 MG: 4 INJECTION, SOLUTION INTRA-ARTICULAR; INTRALESIONAL; INTRAMUSCULAR; INTRAVENOUS; SOFT TISSUE at 12:31

## 2019-04-08 RX ADMIN — MIDAZOLAM 2 MG: 1 INJECTION INTRAMUSCULAR; INTRAVENOUS at 12:19

## 2019-04-08 RX ADMIN — CEFAZOLIN SODIUM 2 G: 2 INJECTION, SOLUTION INTRAVENOUS at 12:23

## 2019-04-08 RX ADMIN — LIDOCAINE HYDROCHLORIDE 100 MG: 20 INJECTION, SOLUTION INFILTRATION; PERINEURAL at 12:22

## 2019-04-08 ASSESSMENT — LIFESTYLE VARIABLES: TOBACCO_USE: 0

## 2019-04-08 ASSESSMENT — MIFFLIN-ST. JEOR: SCORE: 1272.29

## 2019-04-08 NOTE — ANESTHESIA CARE TRANSFER NOTE
Patient: Mel Reeves    Procedure(s):  CYSTOSCOPY, RIGHT RETROGRADES, PYELOGRAM, RIGHT URETEROSCOPY, LASER HOLMIUM LITHOTRIPSY BASKET REMOVAL OF STONES AND RIGHT STENT PLACEMENT    Diagnosis: RIGHT URETERAL STONE  Diagnosis Additional Information: No value filed.    Anesthesia Type:   General, LMA     Note:  Airway :Face Mask  Patient transferred to:PACU  Handoff Report: Identifed the Patient, Identified the Reponsible Provider, Reviewed the pertinent medical history, Discussed the surgical course, Reviewed Intra-OP anesthesia mangement and issues during anesthesia, Set expectations for post-procedure period and Allowed opportunity for questions and acknowledgement of understanding      Vitals: (Last set prior to Anesthesia Care Transfer)    CRNA VITALS  4/8/2019 1240 - 4/8/2019 1316      4/8/2019             Pulse:  120    SpO2:  100 %    Resp Rate (set):  10                Electronically Signed By: ARCELIA Guzmán CRNA  April 8, 2019  1:16 PM

## 2019-04-08 NOTE — ANESTHESIA POSTPROCEDURE EVALUATION
Patient: Mel Reeves    Procedure(s):  CYSTOSCOPY, RIGHT RETROGRADES, PYELOGRAM, RIGHT URETEROSCOPY, LASER HOLMIUM LITHOTRIPSY BASKET REMOVAL OF STONES AND RIGHT STENT PLACEMENT    Diagnosis:RIGHT URETERAL STONE  Diagnosis Additional Information: No value filed.    Anesthesia Type:  General, LMA    Note:  Anesthesia Post Evaluation    Patient location during evaluation: PACU  Patient participation: Able to fully participate in evaluation  Level of consciousness: awake and alert  Pain management: adequate  Airway patency: patent  Cardiovascular status: acceptable  Respiratory status: acceptable  Hydration status: acceptable  PONV: none     Anesthetic complications: None          Last vitals:  Vitals:    04/08/19 1400 04/08/19 1415 04/08/19 1430   BP: (!) 88/62 101/86 107/70   Pulse: 78 81    Resp: 12 16 16   Temp: 36.8  C (98.2  F) 36.9  C (98.4  F) 36.8  C (98.2  F)   SpO2: 98% 98% 100%         Electronically Signed By: Vick Reyes MD  April 8, 2019  3:14 PM

## 2019-04-08 NOTE — ANESTHESIA PREPROCEDURE EVALUATION
Anesthesia Pre-Procedure Evaluation    Patient: Mel Reeves   MRN: [id number] : 1970          Preoperative Diagnosis: RIGHT URETERAL STONE    Procedure(s):  CYSTOSCOPY, RIGHT RETROGRADES, PYELOGRAM, RIGHT URETEROSCOPY, LASER HOLMIUM LITHOTRIPSY BASKET REMOVAL OF STONES AND RIGHT STENT PLACEMENT    Past Medical History:   Diagnosis Date     Osteogenesis imperfecta      Past Surgical History:   Procedure Laterality Date     CYSTOSCOPY         Anesthesia Evaluation     .             ROS/MED HX    ENT/Pulmonary:      (-) tobacco use and sleep apnea   Neurologic:       Cardiovascular:         METS/Exercise Tolerance:     Hematologic:         Musculoskeletal: Comment: Osteogenisis Imperfecta        GI/Hepatic:        (-) GERD   Renal/Genitourinary: Comment: Ovarian cyst    (+) Nephrolithiasis ,       Endo:         Psychiatric:         Infectious Disease:         Malignancy:         Other:                          Physical Exam  Normal systems: cardiovascular, pulmonary and dental    Airway   Mallampati: II  TM distance: >3 FB  Neck ROM: full    Dental     Cardiovascular       Pulmonary             Lab Results   Component Value Date    WBC 12.6 (H) 2019    HGB 13.2 2019    HCT 39.7 2019     2019     2019    POTASSIUM 3.3 (L) 2019    CHLORIDE 104 2019    CO2 25 2019    BUN 13 2019    CR 0.69 2019     (H) 2019    MINI 8.9 2019    ALBUMIN 4.1 2019    PROTTOTAL 7.6 2019    ALT 13 2019    AST 15 2019    ALKPHOS 63 2019    BILITOTAL 0.3 2019    LIPASE 159 2019    HCGS Negative 2019       Preop Vitals  BP Readings from Last 3 Encounters:   19 125/62   19 116/86   19 112/69    Pulse Readings from Last 3 Encounters:   19 90   19 84      Resp Readings from Last 3 Encounters:   19 16   19 18    SpO2 Readings from Last 3 Encounters:   19  "100%   04/05/19 100%   04/02/19 99%      Temp Readings from Last 1 Encounters:   04/08/19 35.6  C (96  F) (Oral)    Ht Readings from Last 1 Encounters:   04/08/19 1.651 m (5' 5\")      Wt Readings from Last 1 Encounters:   04/08/19 63.1 kg (139 lb 3.2 oz)    Estimated body mass index is 23.16 kg/m  as calculated from the following:    Height as of this encounter: 1.651 m (5' 5\").    Weight as of this encounter: 63.1 kg (139 lb 3.2 oz).       Anesthesia Plan      History & Physical Review  History and physical reviewed and following examination; no interval change.    ASA Status:  2 .    NPO Status:  > 8 hours    Plan for General and LMA with Intravenous and Propofol induction. Maintenance will be Inhalation.    PONV prophylaxis:  Ondansetron (or other 5HT-3) and Dexamethasone or Solumedrol       Postoperative Care  Postoperative pain management:  IV analgesics and Oral pain medications.      Consents  Anesthetic plan, risks, benefits and alternatives discussed with:  Patient and Spouse..                 Vick Reyes MD  "

## 2019-04-08 NOTE — DISCHARGE INSTRUCTIONS
POSTOPERATIVE INSTRUCTIONS    Diagnosis-------------------------------   RIGHT ureteral stone    Procedure-------------------------------  Procedure(s) (LRB):  CYSTOSCOPY, RIGHT RETROGRADES, PYELOGRAM, RIGHT URETEROSCOPY, LASER HOLMIUM LITHOTRIPSY BASKET REMOVAL OF STONES AND RIGHT STENT PLACEMENT (Right)      Findings--------------------------------  Cystoscopy with no evidence of stone within the bladder. RIGHT Ureteroscopy with evidence of the 5x6mm stone within the RIGHT distal ureter. Stone partially dusted and basketed and removed. RIGHT Retrograde Pyelogram with no further evidence of a ureteral stone.    Home-going instructions-----------------         Activity Limitation:     - No driving or operating heavy machinery while on narcotic pain medication.     FOLLOW THESE INSTRUCTIONS AS INDICATED BELOW:  - Observe operative area for signs of excessive bleeding.  - You may shower.  - Increase fluid intake to promote clear urine.  - Resume usual diet as tolerated    What to expect while recovering-----------  - You may experience some intermittent bleeding that makes your urine pink or cherry colored. This is normal.  - However, if you are unable to urinate, passing large amount of clots, have gayle blood in your urine, or have a temperature >101 degrees, call the urology nurse on call, or present to your nearest emergency department.  - You are encouraged to walk daily, and have no activity restrictions.   - A URETERAL STENT has been placed that allows urine to flow unobstructed from your kidney into your bladder.  The stent has a curl in the kidney and a curl in the bladder.  The curl in the bladder can cause some urgency and frequency of urination as well as some mild blood in the urine.  The curl in the kidney can cause some mild flank discomfort.  This may be more noticeable when you urinate.  A URETERAL STENT is meant to be left in temporarily.  It must be removed or changed no later than 3 months after  it's insertion.  If it's not removed it can result in stone overgrowth on the stent that can cause pain, infection, and can be very difficult to remove.      Discharge Medications/instructions:     - Flomax (tamsulosin) to be taken daily until stent is removed    - Ditropan to be taken daily until stent removal    - Take Tylenol 1000mg every 6 hours for pain    - Take Toradol 10mg every 6 hours as needed for additional pain control    - Take Oxycodone 5mg every 4-6 hours only for break through pain    - Take Colace while taking Oxycodone to prevent constipation      Questions/concerns------------------------  [facility]: [phone]    Future appointments  You will return for ureteral stent removal with Dr. Ramirez next Monday.    Warner Ramirez MD        Same Day Surgery Discharge Instructions for  Sedation and General Anesthesia       It's not unusual to feel dizzy, light-headed or faint for up to 24 hours after surgery or while taking pain medication.  If you have these symptoms: sit for a few minutes before standing and have someone assist you when you get up to walk or use the bathroom.      You should rest and relax for the next 24 hours. We recommend you make arrangements to have an adult stay with you for at least 24 hours after your discharge.  Avoid hazardous and strenuous activity.      DO NOT DRIVE any vehicle or operate mechanical equipment for 24 hours following the end of your surgery.  Even though you may feel normal, your reactions may be affected by the medication you have received.      Do not drink alcoholic beverages for 24 hours following surgery.       Slowly progress to your regular diet as you feel able. It's not unusual to feel nauseated and/or vomit after receiving anesthesia.  If you develop these symptoms, drink clear liquids (apple juice, ginger ale, broth, 7-up, etc. ) until you feel better.  If your nausea and vomiting persists for 24 hours, please notify your  surgeon.        All narcotic pain medications, along with inactivity and anesthesia, can cause constipation. Drinking plenty of liquids and increasing fiber intake will help.      For any questions of a medical nature, call your surgeon.      Do not make important decisions for 24 hours.      If you had general anesthesia, you may have a sore throat for a couple of days related to the breathing tube used during surgery.  You may use Cepacol lozenges to help with this discomfort.  If it worsens or if you develop a fever, contact your surgeon.       If you feel your pain is not well managed with the pain medications prescribed by your surgeon, please contact your surgeon's office to let them know so they can address your concerns.       Today you received Toradol, an antiinflammatory medication similar to Ibuprofen.  You should not take other antiinflammatory medication, such as Ibuprofen, Motrin, Advil, Aleve, Naprosyn, etc until 7pm.

## 2019-04-08 NOTE — OP NOTE
OPERATIVE REPORT  DATE OF SURGERY: 04/08/19  LOCATION OF SURGERY: [facility] OR  PREOPERATIVE DIAGNOSIS:  (N20.1) Right ureteral stone  (primary encounter diagnosis)  POSTOPERATIVE DIAGNOSIS: (N20.1) Right ureteral stone  (primary encounter diagnosis)  START TIME: 12:41 PM  END TIME: 12:59 PM  PROCEDURE PERFORMED:   1. Cystoscopy  2. RIGHT retrograde pyelogram  3. RIGHT ureteroscopy with laser lithotripsy  4. RIGHT ureteroscopy with basketing of stones  5. RIGHT JJ stent placement  6. <1hr physician fluoroscopy time      STAFF SURGEON: Warner Ramirez MD  ANESTHESIA: General.   ESTIMATED BLOOD LOSS: 2 mL.   DRAINS AND TUBES: RIGHT 6fr x 22cm ureteral stent, 16fr hernandez catheter  COMPLICATIONS: None.   DISPOSITION: PACU.   SPECIMENS OBTAINED:   ID Type Source Tests Collected by Time Destination   1 : Right ureteral stone Calculus/Stone Ureter, Right STONE ANALYSIS Warner Ramirez MD 4/8/2019 12:51 PM      SIGNIFICANT FINDINGS: Cystoscopy with no evidence of stone within the bladder. RIGHT Ureteroscopy with evidence of the 5x6mm stone within the RIGHT distal ureter. Stone partially dusted and basketed and removed. RIGHT Retrograde Pyelogram with no further evidence of a ureteral stone.     HISTORY OF PRESENT ILLNESS: Mrs. Reeves is a 48 year old 4/2 presented to the ER Right flank and abd. She reports that this pain started a few days prior and gradually worsened. Pain is located in the RIGHT flank with radiation to the RIGHT lower quadrant. Pain is sharp, intermittent in nature. She denies associated fever, chills, nausea or vomiting. CT was obtained which demonstrated a 5x6mm RIGHT distal / UVJ stone with mild hydronephrosis and a large LEFT renal staghorn calculus. She was counseled on treatment options and presents for the above surgery.    OPERATION PERFORMED:   Informed consent was obtained and the patient was brought to the operating room where general anesthesia was induced. The patient was given appropriate  preoperative antibiotics and positioned supine. The patient was then repositioned in dorsal lithotomy with all pressure points padded. We then performed a timeout, verifying the correct patient's site and procedure to be performed.    A 22fr cystoscope was inserted atraumatically into the bladder. Complete cystoscopy was performed with no evidence of a stone within the bladder. The RIGHT UO was identified and cannulated with a 0.035 Sensor wire which was advanced up the RIGHT kidney under fluoroscopic guidance. The cystoscope was removed. A semi-rigid ureteroscope was assembled and inserted atraumatically into the bladder. The ureteroscope was advanced atraumatically up the RIGHT UO and the 5x6mm stone was identified with evidence of impaction. The stone was partially dusted with the laser fiber. The stone was then removed in entirety. The ureteroscope was reinserted into the bladder and advanced up the ureter. A gentle Retrograde Pyelogram was completed with no evidence of stone in the ureter and some tortuosity of the proximal ureter. The Sensor wire was noted to be in the tortuous ureter and the was repositioned through the ureteroscope. The ureteroscope was then removed.There was no evidence of ureteral injury. A 6fr x 22cm JJ stent was advanced over the Sensor wire with good curl noted in the renal pelvis and in the bladder fluoroscopically. A 16fr hernandez was placed.   The patient was emerged from anesthesia and recovered in the PACU.      Warner Ramirez MD   Urology  [facility]  Clinic Phone [phone]

## 2019-04-09 LAB — COPATH REPORT: NORMAL

## 2019-04-13 LAB
APPEARANCE STONE: NORMAL
COMPN STONE: NORMAL
NUMBER STONE: 1
SIZE STONE: NORMAL MM
WT STONE: 40 MG

## 2019-04-15 ENCOUNTER — OFFICE VISIT (OUTPATIENT)
Dept: UROLOGY | Facility: CLINIC | Age: 49
End: 2019-04-15
Payer: COMMERCIAL

## 2019-04-15 VITALS
OXYGEN SATURATION: 99 % | DIASTOLIC BLOOD PRESSURE: 70 MMHG | HEIGHT: 65 IN | SYSTOLIC BLOOD PRESSURE: 120 MMHG | HEART RATE: 87 BPM | BODY MASS INDEX: 23.32 KG/M2 | WEIGHT: 140 LBS

## 2019-04-15 DIAGNOSIS — Z79.2 PROPHYLACTIC ANTIBIOTIC: ICD-10-CM

## 2019-04-15 DIAGNOSIS — N20.0 CALCULUS OF KIDNEY: ICD-10-CM

## 2019-04-15 DIAGNOSIS — Z46.6 ENCOUNTER FOR REMOVAL OF URETERAL STENT: Primary | ICD-10-CM

## 2019-04-15 PROCEDURE — 52310 CYSTOSCOPY AND TREATMENT: CPT | Mod: 58 | Performed by: UROLOGY

## 2019-04-15 RX ORDER — CIPROFLOXACIN 500 MG/1
TABLET, FILM COATED ORAL
Qty: 1 TABLET | Refills: 0 | Status: SHIPPED | OUTPATIENT
Start: 2019-04-15 | End: 2019-05-17

## 2019-04-15 ASSESSMENT — PAIN SCALES - GENERAL: PAINLEVEL: NO PAIN (0)

## 2019-04-15 ASSESSMENT — MIFFLIN-ST. JEOR: SCORE: 1275.92

## 2019-04-15 NOTE — PATIENT INSTRUCTIONS

## 2019-04-15 NOTE — LETTER
4/15/2019       RE: Mel Reeves  [address] [address]     Dear Colleague,    Thank you for referring your patient, Mel Reeves, to the [facility] [place] [facility]. Please see a copy of my visit note below.    CYSTOSCOPY AND STENT REMOVAL PROCEDURE NOTE:    Mel Reeves is a 48 year old female who presents with ureteral stent for a cystoscopy and stent removal.    Pt ID verified with patient: Yes     Procedure verified with patient: Yes     Procedure confirmed with physician and support staff: Yes     Consent confirmed with physician and support staff.    Sign In:  History and Physical Exam reviewed  Primary Diagnosis: Right ureteral stent   Informed Consent Discussed: Yes   Sign in Communication: Yes   Time Out:  Team Confirms the Correct Patient, Correct Procedure; Yes , Correct Site and Site Marking, Correct Position (if applicable).  Affirmation of Time Out: Yes   Sign Out:  Sign Out Discussion: Yes       Mel Reeves is a 48 year old female with an indwelling ureteral stent in need of removal.    CYSTOSCOPY PROCEDURE:  After sterile preparation and draping of the patient,  a 17-Latvian flexible cystoscope was introduced via the urethra.  It was passed without difficulty into the bladder.  The urethra was open without evidence of stricture.  The ureteral orifices were orthotopic.  The double J stent was seen coming out the right side.  It was grasped with an alligator forceps and extracted intact without difficulty.  The patient tolerated the procedure well    A/P Successful stent removal  Prophylactic antibiotic ordered  Stone prevention counseling provided today    Stone analysis:  Calculi composed primarily of:   60% calcium oxalate monohydrate,   10% calcium oxalate dihydrate, and   30% calcium phosphate (hydroxy- and carbonate- apatite).     LEFT staghorn kidney stone  - We discussed that this stone is a very large  stone filling up a large part of her LEFT renal collecting system. We discussed that this stone will require treatment in the near future, however given her lack of symptoms on this side, her right ureteral stone was treated first. We discussed that she may schedule surgery for the LEFT side at her convenience given her lack of symptoms or UTIs.  - We discussed treatment options which include:  ---- ESWL: We discussed that this is NOT an option given the size of the stone  ---- URETEROSCOPY: we discussed that this is NOT an option given the size of the stone  ---- PCNL: we discussed a 95% chance of stone clearance with a single procedure, the need for an overnight stay, a ~5% chance of blood transfusion or serious infection. We discussed the need for a ureteral stent.  - Order for surgery placed    Watch for any new onset fevers, signs of UTI.  May expect some pain after removal.  If this is severe, or last many hours, you may need to return for replacement of stent.    Warner Ramirez MD   Urology  [facility]

## 2019-04-15 NOTE — PROGRESS NOTES
CYSTOSCOPY AND STENT REMOVAL PROCEDURE NOTE:    Mel Reeves is a 48 year old female who presents with ureteral stent for a cystoscopy and stent removal.    Pt ID verified with patient: Yes     Procedure verified with patient: Yes     Procedure confirmed with physician and support staff: Yes     Consent confirmed with physician and support staff.    Sign In:  History and Physical Exam reviewed  Primary Diagnosis: Right ureteral stent   Informed Consent Discussed: Yes   Sign in Communication: Yes   Time Out:  Team Confirms the Correct Patient, Correct Procedure; Yes , Correct Site and Site Marking, Correct Position (if applicable).  Affirmation of Time Out: Yes   Sign Out:  Sign Out Discussion: Yes       Mel Reeves is a 48 year old female with an indwelling ureteral stent in need of removal.    CYSTOSCOPY PROCEDURE:  After sterile preparation and draping of the patient,  a 17-Welsh flexible cystoscope was introduced via the urethra.  It was passed without difficulty into the bladder.  The urethra was open without evidence of stricture.  The ureteral orifices were orthotopic.  The double J stent was seen coming out the right side.  It was grasped with an alligator forceps and extracted intact without difficulty.  The patient tolerated the procedure well    A/P Successful stent removal  Prophylactic antibiotic ordered  Stone prevention counseling provided today    Stone analysis:  Calculi composed primarily of:   60% calcium oxalate monohydrate,   10% calcium oxalate dihydrate, and   30% calcium phosphate (hydroxy- and carbonate- apatite).     LEFT staghorn kidney stone  - We discussed that this stone is a very large stone filling up a large part of her LEFT renal collecting system. We discussed that this stone will require treatment in the near future, however given her lack of symptoms on this side, her right ureteral stone was treated first. We discussed that she may schedule surgery for the LEFT side at her  convenience given her lack of symptoms or UTIs.  - We discussed treatment options which include:  ---- ESWL: We discussed that this is NOT an option given the size of the stone  ---- URETEROSCOPY: we discussed that this is NOT an option given the size of the stone  ---- PCNL: we discussed a 95% chance of stone clearance with a single procedure, the need for an overnight stay, a ~5% chance of blood transfusion or serious infection. We discussed the need for a ureteral stent.  - Order for surgery placed    Watch for any new onset fevers, signs of UTI.  May expect some pain after removal.  If this is severe, or last many hours, you may need to return for replacement of stent.    Warner Ramirez MD   Urology  [facility]

## 2019-04-15 NOTE — NURSING NOTE
"Chief Complaint   Patient presents with     Kidney Stone(s) Followup     here for cysto with stent removal     Initial /70 (BP Location: Right arm, Patient Position: Sitting, Cuff Size: Adult Regular)   Pulse 87   Ht 1.651 m (5' 5\")   Wt 63.5 kg (140 lb)   LMP 03/24/2019 (Approximate)   SpO2 99%   BMI 23.30 kg/m   Estimated body mass index is 23.3 kg/m  as calculated from the following:    Height as of this encounter: 1.651 m (5' 5\").    Weight as of this encounter: 63.5 kg (140 lb).  BP completed using cuff size:regular-right.  Prior to the start of the procedure and with procedural staff participation, I verbally confirmed the patient s identity using two indicators, relevant allergies, that the procedure was appropriate and matched the consent or emergent situation, and that the correct equipment/implants were available. Immediately prior to starting the procedure I conducted the Time Out with the procedural staff and re-confirmed the patient s name, procedure, and site/side. I have wiped the patient off with the povidone-Iodine solution, draped them,  used Lidocaine hydrochloride jelly, and instilled sterile water into the bladder. (The Joint Commission universal protocol was followed.)  Yes    Sedation (Moderate or Deep): None  Abram BORJAS  [facility] April 15, 2019 2:03 PM  "

## 2019-05-09 DIAGNOSIS — N20.1 RIGHT URETERAL STONE: ICD-10-CM

## 2019-05-10 ENCOUNTER — TRANSFERRED RECORDS (OUTPATIENT)
Dept: HEALTH INFORMATION MANAGEMENT | Facility: CLINIC | Age: 49
End: 2019-05-10

## 2019-05-14 RX ORDER — TAMSULOSIN HYDROCHLORIDE 0.4 MG/1
0.4 CAPSULE ORAL DAILY
Qty: 30 CAPSULE | Refills: 0 | Status: SHIPPED | OUTPATIENT
Start: 2019-05-14 | End: 2019-05-17

## 2019-05-17 RX ORDER — SUMATRIPTAN 50 MG/1
50 TABLET, FILM COATED ORAL
COMMUNITY

## 2019-05-17 RX ORDER — IBUPROFEN 200 MG
1 CAPSULE ORAL 2 TIMES DAILY
COMMUNITY
End: 2021-10-26

## 2019-05-20 ENCOUNTER — ANESTHESIA EVENT (OUTPATIENT)
Dept: SURGERY | Facility: CLINIC | Age: 49
End: 2019-05-20
Payer: COMMERCIAL

## 2019-05-20 ENCOUNTER — HOSPITAL ENCOUNTER (OUTPATIENT)
Facility: CLINIC | Age: 49
Discharge: HOME | End: 2019-05-21
Attending: UROLOGY | Admitting: UROLOGY
Payer: COMMERCIAL

## 2019-05-20 ENCOUNTER — ANESTHESIA (OUTPATIENT)
Dept: SURGERY | Facility: CLINIC | Age: 49
End: 2019-05-20
Payer: COMMERCIAL

## 2019-05-20 ENCOUNTER — APPOINTMENT (OUTPATIENT)
Dept: INTERVENTIONAL RADIOLOGY/VASCULAR | Facility: CLINIC | Age: 49
End: 2019-05-20
Attending: UROLOGY
Payer: COMMERCIAL

## 2019-05-20 DIAGNOSIS — N20.0 STONE IN KIDNEY: ICD-10-CM

## 2019-05-20 DIAGNOSIS — N20.0 CALCULUS OF LEFT KIDNEY: Primary | ICD-10-CM

## 2019-05-20 LAB
ANION GAP SERPL CALCULATED.3IONS-SCNC: 7 MMOL/L (ref 3–14)
BUN SERPL-MCNC: 10 MG/DL (ref 7–30)
CALCIUM SERPL-MCNC: 8.2 MG/DL (ref 8.5–10.1)
CHLORIDE SERPL-SCNC: 106 MMOL/L (ref 94–109)
CO2 SERPL-SCNC: 27 MMOL/L (ref 20–32)
CREAT SERPL-MCNC: 0.62 MG/DL (ref 0.52–1.04)
ERYTHROCYTE [DISTWIDTH] IN BLOOD BY AUTOMATED COUNT: 13 % (ref 10–15)
GFR SERPL CREATININE-BSD FRML MDRD: >90 ML/MIN/{1.73_M2}
GLUCOSE SERPL-MCNC: 129 MG/DL (ref 70–99)
HCG UR QL: NEGATIVE
HCT VFR BLD AUTO: 37.4 % (ref 35–47)
HGB BLD-MCNC: 12.3 G/DL (ref 11.7–15.7)
MCH RBC QN AUTO: 26.7 PG (ref 26.5–33)
MCHC RBC AUTO-ENTMCNC: 32.9 G/DL (ref 31.5–36.5)
MCV RBC AUTO: 81 FL (ref 78–100)
PLATELET # BLD AUTO: 367 10E9/L (ref 150–450)
POTASSIUM SERPL-SCNC: 3.8 MMOL/L (ref 3.4–5.3)
RBC # BLD AUTO: 4.6 10E12/L (ref 3.8–5.2)
SODIUM SERPL-SCNC: 140 MMOL/L (ref 133–144)
WBC # BLD AUTO: 7.1 10E9/L (ref 4–11)

## 2019-05-20 PROCEDURE — 25800025 ZZH RX 258: Performed by: UROLOGY

## 2019-05-20 PROCEDURE — 25800030 ZZH RX IP 258 OP 636: Performed by: NURSE ANESTHETIST, CERTIFIED REGISTERED

## 2019-05-20 PROCEDURE — 40000170 ZZH STATISTIC PRE-PROCEDURE ASSESSMENT II: Performed by: UROLOGY

## 2019-05-20 PROCEDURE — 36000063 ZZH SURGERY LEVEL 4 EA 15 ADDTL MIN: Performed by: UROLOGY

## 2019-05-20 PROCEDURE — 25500064 ZZH RX 255 OP 636: Performed by: UROLOGY

## 2019-05-20 PROCEDURE — 50081 PERQ NL/PL LITHOTRP CPLX>2CM: CPT | Mod: LT | Performed by: UROLOGY

## 2019-05-20 PROCEDURE — 25800030 ZZH RX IP 258 OP 636: Performed by: UROLOGY

## 2019-05-20 PROCEDURE — 25000125 ZZHC RX 250: Performed by: UROLOGY

## 2019-05-20 PROCEDURE — 25800030 ZZH RX IP 258 OP 636: Performed by: ANESTHESIOLOGY

## 2019-05-20 PROCEDURE — 88300 SURGICAL PATH GROSS: CPT | Performed by: UROLOGY

## 2019-05-20 PROCEDURE — 36000093 ZZH SURGERY LEVEL 4 1ST 30 MIN: Performed by: UROLOGY

## 2019-05-20 PROCEDURE — 88300 SURGICAL PATH GROSS: CPT | Mod: 26 | Performed by: UROLOGY

## 2019-05-20 PROCEDURE — 74420 UROGRAPHY RTRGR +-KUB: CPT | Mod: 26 | Performed by: UROLOGY

## 2019-05-20 PROCEDURE — 25000125 ZZHC RX 250: Performed by: NURSE ANESTHETIST, CERTIFIED REGISTERED

## 2019-05-20 PROCEDURE — 80048 BASIC METABOLIC PNL TOTAL CA: CPT | Performed by: UROLOGY

## 2019-05-20 PROCEDURE — C1769 GUIDE WIRE: HCPCS | Performed by: UROLOGY

## 2019-05-20 PROCEDURE — 37000009 ZZH ANESTHESIA TECHNICAL FEE, EACH ADDTL 15 MIN: Performed by: UROLOGY

## 2019-05-20 PROCEDURE — 36415 COLL VENOUS BLD VENIPUNCTURE: CPT | Performed by: UROLOGY

## 2019-05-20 PROCEDURE — 25000128 H RX IP 250 OP 636: Performed by: UROLOGY

## 2019-05-20 PROCEDURE — 37000008 ZZH ANESTHESIA TECHNICAL FEE, 1ST 30 MIN: Performed by: UROLOGY

## 2019-05-20 PROCEDURE — 85027 COMPLETE CBC AUTOMATED: CPT | Performed by: UROLOGY

## 2019-05-20 PROCEDURE — 25000128 H RX IP 250 OP 636: Performed by: ANESTHESIOLOGY

## 2019-05-20 PROCEDURE — 27210794 ZZH OR GENERAL SUPPLY STERILE: Performed by: UROLOGY

## 2019-05-20 PROCEDURE — 71000012 ZZH RECOVERY PHASE 1 LEVEL 1 FIRST HR: Performed by: UROLOGY

## 2019-05-20 PROCEDURE — C2617 STENT, NON-COR, TEM W/O DEL: HCPCS | Performed by: UROLOGY

## 2019-05-20 PROCEDURE — 25000566 ZZH SEVOFLURANE, EA 15 MIN: Performed by: UROLOGY

## 2019-05-20 PROCEDURE — 25000132 ZZH RX MED GY IP 250 OP 250 PS 637: Performed by: UROLOGY

## 2019-05-20 PROCEDURE — C1758 CATHETER, URETERAL: HCPCS | Performed by: UROLOGY

## 2019-05-20 PROCEDURE — 25000125 ZZHC RX 250: Performed by: ANESTHESIOLOGY

## 2019-05-20 PROCEDURE — C1894 INTRO/SHEATH, NON-LASER: HCPCS | Performed by: UROLOGY

## 2019-05-20 PROCEDURE — 81025 URINE PREGNANCY TEST: CPT | Performed by: UROLOGY

## 2019-05-20 PROCEDURE — C1726 CATH, BAL DIL, NON-VASCULAR: HCPCS | Performed by: UROLOGY

## 2019-05-20 PROCEDURE — 82365 CALCULUS SPECTROSCOPY: CPT | Performed by: UROLOGY

## 2019-05-20 PROCEDURE — 25000128 H RX IP 250 OP 636: Performed by: NURSE ANESTHETIST, CERTIFIED REGISTERED

## 2019-05-20 PROCEDURE — 71000013 ZZH RECOVERY PHASE 1 LEVEL 1 EA ADDTL HR: Performed by: UROLOGY

## 2019-05-20 PROCEDURE — 25000131 ZZH RX MED GY IP 250 OP 636 PS 637: Performed by: UROLOGY

## 2019-05-20 DEVICE — STENT URETERAL CONTOUR SOFT PERCUFLEX 7FRX24CM
Type: IMPLANTABLE DEVICE | Site: URETER | Status: NON-FUNCTIONAL
Removed: 2019-06-19

## 2019-05-20 RX ORDER — MEPERIDINE HYDROCHLORIDE 25 MG/ML
12.5 INJECTION INTRAMUSCULAR; INTRAVENOUS; SUBCUTANEOUS
Status: DISCONTINUED | OUTPATIENT
Start: 2019-05-20 | End: 2019-05-20 | Stop reason: HOSPADM

## 2019-05-20 RX ORDER — PROPOFOL 10 MG/ML
INJECTION, EMULSION INTRAVENOUS PRN
Status: DISCONTINUED | OUTPATIENT
Start: 2019-05-20 | End: 2019-05-20

## 2019-05-20 RX ORDER — HYDROMORPHONE HYDROCHLORIDE 1 MG/ML
.3-.5 INJECTION, SOLUTION INTRAMUSCULAR; INTRAVENOUS; SUBCUTANEOUS EVERY 10 MIN PRN
Status: DISCONTINUED | OUTPATIENT
Start: 2019-05-20 | End: 2019-05-20 | Stop reason: HOSPADM

## 2019-05-20 RX ORDER — LIDOCAINE HYDROCHLORIDE 20 MG/ML
INJECTION, SOLUTION INFILTRATION; PERINEURAL PRN
Status: DISCONTINUED | OUTPATIENT
Start: 2019-05-20 | End: 2019-05-20

## 2019-05-20 RX ORDER — PROPOFOL 10 MG/ML
INJECTION, EMULSION INTRAVENOUS CONTINUOUS PRN
Status: DISCONTINUED | OUTPATIENT
Start: 2019-05-20 | End: 2019-05-20

## 2019-05-20 RX ORDER — FENTANYL CITRATE 50 UG/ML
25-50 INJECTION, SOLUTION INTRAMUSCULAR; INTRAVENOUS
Status: DISCONTINUED | OUTPATIENT
Start: 2019-05-20 | End: 2019-05-20 | Stop reason: HOSPADM

## 2019-05-20 RX ORDER — VECURONIUM BROMIDE 1 MG/ML
INJECTION, POWDER, LYOPHILIZED, FOR SOLUTION INTRAVENOUS PRN
Status: DISCONTINUED | OUTPATIENT
Start: 2019-05-20 | End: 2019-05-20

## 2019-05-20 RX ORDER — MAGNESIUM HYDROXIDE 1200 MG/15ML
LIQUID ORAL PRN
Status: DISCONTINUED | OUTPATIENT
Start: 2019-05-20 | End: 2019-05-20 | Stop reason: HOSPADM

## 2019-05-20 RX ORDER — FUROSEMIDE 10 MG/ML
INJECTION INTRAMUSCULAR; INTRAVENOUS PRN
Status: DISCONTINUED | OUTPATIENT
Start: 2019-05-20 | End: 2019-05-20

## 2019-05-20 RX ORDER — IOPAMIDOL 612 MG/ML
INJECTION, SOLUTION INTRAVASCULAR PRN
Status: DISCONTINUED | OUTPATIENT
Start: 2019-05-20 | End: 2019-05-20 | Stop reason: HOSPADM

## 2019-05-20 RX ORDER — NEOSTIGMINE METHYLSULFATE 1 MG/ML
VIAL (ML) INJECTION PRN
Status: DISCONTINUED | OUTPATIENT
Start: 2019-05-20 | End: 2019-05-20

## 2019-05-20 RX ORDER — CEFAZOLIN SODIUM 1 G/3ML
1 INJECTION, POWDER, FOR SOLUTION INTRAMUSCULAR; INTRAVENOUS SEE ADMIN INSTRUCTIONS
Status: DISCONTINUED | OUTPATIENT
Start: 2019-05-20 | End: 2019-05-20 | Stop reason: HOSPADM

## 2019-05-20 RX ORDER — DEXAMETHASONE SODIUM PHOSPHATE 4 MG/ML
INJECTION, SOLUTION INTRA-ARTICULAR; INTRALESIONAL; INTRAMUSCULAR; INTRAVENOUS; SOFT TISSUE PRN
Status: DISCONTINUED | OUTPATIENT
Start: 2019-05-20 | End: 2019-05-20

## 2019-05-20 RX ORDER — HYDROMORPHONE HYDROCHLORIDE 1 MG/ML
0.2 INJECTION, SOLUTION INTRAMUSCULAR; INTRAVENOUS; SUBCUTANEOUS
Status: DISCONTINUED | OUTPATIENT
Start: 2019-05-20 | End: 2019-05-21 | Stop reason: HOSPADM

## 2019-05-20 RX ORDER — ONDANSETRON 4 MG/1
4 TABLET, ORALLY DISINTEGRATING ORAL EVERY 6 HOURS PRN
Status: DISCONTINUED | OUTPATIENT
Start: 2019-05-20 | End: 2019-05-21 | Stop reason: HOSPADM

## 2019-05-20 RX ORDER — CEFAZOLIN SODIUM 2 G/100ML
2 INJECTION, SOLUTION INTRAVENOUS
Status: COMPLETED | OUTPATIENT
Start: 2019-05-20 | End: 2019-05-20

## 2019-05-20 RX ORDER — ONDANSETRON 2 MG/ML
4 INJECTION INTRAMUSCULAR; INTRAVENOUS EVERY 6 HOURS PRN
Status: DISCONTINUED | OUTPATIENT
Start: 2019-05-20 | End: 2019-05-21 | Stop reason: HOSPADM

## 2019-05-20 RX ORDER — NALOXONE HYDROCHLORIDE 0.4 MG/ML
.1-.4 INJECTION, SOLUTION INTRAMUSCULAR; INTRAVENOUS; SUBCUTANEOUS
Status: DISCONTINUED | OUTPATIENT
Start: 2019-05-20 | End: 2019-05-20 | Stop reason: HOSPADM

## 2019-05-20 RX ORDER — GLYCOPYRROLATE 0.2 MG/ML
INJECTION, SOLUTION INTRAMUSCULAR; INTRAVENOUS PRN
Status: DISCONTINUED | OUTPATIENT
Start: 2019-05-20 | End: 2019-05-20

## 2019-05-20 RX ORDER — SCOLOPAMINE TRANSDERMAL SYSTEM 1 MG/1
1 PATCH, EXTENDED RELEASE TRANSDERMAL ONCE
Status: COMPLETED | OUTPATIENT
Start: 2019-05-20 | End: 2019-05-20

## 2019-05-20 RX ORDER — ONDANSETRON 4 MG/1
4 TABLET, ORALLY DISINTEGRATING ORAL EVERY 30 MIN PRN
Status: DISCONTINUED | OUTPATIENT
Start: 2019-05-20 | End: 2019-05-20 | Stop reason: HOSPADM

## 2019-05-20 RX ORDER — ONDANSETRON 2 MG/ML
4 INJECTION INTRAMUSCULAR; INTRAVENOUS EVERY 30 MIN PRN
Status: DISCONTINUED | OUTPATIENT
Start: 2019-05-20 | End: 2019-05-20 | Stop reason: HOSPADM

## 2019-05-20 RX ORDER — ACETAMINOPHEN 325 MG/1
975 TABLET ORAL EVERY 6 HOURS
Status: DISCONTINUED | OUTPATIENT
Start: 2019-05-20 | End: 2019-05-21 | Stop reason: HOSPADM

## 2019-05-20 RX ORDER — ONDANSETRON 2 MG/ML
INJECTION INTRAMUSCULAR; INTRAVENOUS PRN
Status: DISCONTINUED | OUTPATIENT
Start: 2019-05-20 | End: 2019-05-20

## 2019-05-20 RX ORDER — PROCHLORPERAZINE MALEATE 10 MG
10 TABLET ORAL EVERY 6 HOURS PRN
Status: DISCONTINUED | OUTPATIENT
Start: 2019-05-20 | End: 2019-05-21 | Stop reason: HOSPADM

## 2019-05-20 RX ORDER — NALOXONE HYDROCHLORIDE 0.4 MG/ML
.1-.4 INJECTION, SOLUTION INTRAMUSCULAR; INTRAVENOUS; SUBCUTANEOUS
Status: DISCONTINUED | OUTPATIENT
Start: 2019-05-20 | End: 2019-05-21 | Stop reason: HOSPADM

## 2019-05-20 RX ORDER — SODIUM CHLORIDE 9 MG/ML
INJECTION, SOLUTION INTRAVENOUS CONTINUOUS
Status: DISCONTINUED | OUTPATIENT
Start: 2019-05-20 | End: 2019-05-21 | Stop reason: HOSPADM

## 2019-05-20 RX ORDER — FENTANYL CITRATE 50 UG/ML
INJECTION, SOLUTION INTRAMUSCULAR; INTRAVENOUS PRN
Status: DISCONTINUED | OUTPATIENT
Start: 2019-05-20 | End: 2019-05-20

## 2019-05-20 RX ORDER — SODIUM CHLORIDE, SODIUM LACTATE, POTASSIUM CHLORIDE, CALCIUM CHLORIDE 600; 310; 30; 20 MG/100ML; MG/100ML; MG/100ML; MG/100ML
INJECTION, SOLUTION INTRAVENOUS CONTINUOUS PRN
Status: DISCONTINUED | OUTPATIENT
Start: 2019-05-20 | End: 2019-05-20

## 2019-05-20 RX ORDER — SODIUM CHLORIDE, SODIUM LACTATE, POTASSIUM CHLORIDE, CALCIUM CHLORIDE 600; 310; 30; 20 MG/100ML; MG/100ML; MG/100ML; MG/100ML
INJECTION, SOLUTION INTRAVENOUS CONTINUOUS
Status: DISCONTINUED | OUTPATIENT
Start: 2019-05-20 | End: 2019-05-20 | Stop reason: HOSPADM

## 2019-05-20 RX ORDER — LIDOCAINE 40 MG/G
CREAM TOPICAL
Status: DISCONTINUED | OUTPATIENT
Start: 2019-05-20 | End: 2019-05-21 | Stop reason: HOSPADM

## 2019-05-20 RX ORDER — FENTANYL CITRATE 50 UG/ML
50-100 INJECTION, SOLUTION INTRAMUSCULAR; INTRAVENOUS
Status: DISCONTINUED | OUTPATIENT
Start: 2019-05-20 | End: 2019-05-20 | Stop reason: HOSPADM

## 2019-05-20 RX ORDER — OXYCODONE HYDROCHLORIDE 5 MG/1
5-10 TABLET ORAL
Status: DISCONTINUED | OUTPATIENT
Start: 2019-05-20 | End: 2019-05-21 | Stop reason: HOSPADM

## 2019-05-20 RX ADMIN — OXYCODONE HYDROCHLORIDE 5 MG: 5 TABLET ORAL at 19:39

## 2019-05-20 RX ADMIN — ONDANSETRON 4 MG: 4 TABLET, ORALLY DISINTEGRATING ORAL at 15:52

## 2019-05-20 RX ADMIN — SODIUM CHLORIDE, POTASSIUM CHLORIDE, SODIUM LACTATE AND CALCIUM CHLORIDE: 600; 310; 30; 20 INJECTION, SOLUTION INTRAVENOUS at 09:35

## 2019-05-20 RX ADMIN — FUROSEMIDE 20 MG: 10 INJECTION, SOLUTION INTRAVENOUS at 10:13

## 2019-05-20 RX ADMIN — FENTANYL CITRATE 50 MCG: 50 INJECTION, SOLUTION INTRAMUSCULAR; INTRAVENOUS at 11:32

## 2019-05-20 RX ADMIN — SCOPALAMINE 1 PATCH: 1 PATCH, EXTENDED RELEASE TRANSDERMAL at 07:02

## 2019-05-20 RX ADMIN — ACETAMINOPHEN 975 MG: 325 TABLET, FILM COATED ORAL at 13:36

## 2019-05-20 RX ADMIN — ONDANSETRON 4 MG: 2 INJECTION INTRAMUSCULAR; INTRAVENOUS at 11:35

## 2019-05-20 RX ADMIN — VECURONIUM BROMIDE 2 MG: 1 INJECTION, POWDER, LYOPHILIZED, FOR SOLUTION INTRAVENOUS at 09:12

## 2019-05-20 RX ADMIN — CEFAZOLIN SODIUM 2 G: 2 INJECTION, SOLUTION INTRAVENOUS at 08:10

## 2019-05-20 RX ADMIN — SODIUM CHLORIDE: 9 INJECTION, SOLUTION INTRAVENOUS at 13:04

## 2019-05-20 RX ADMIN — Medication 0.2 MG: at 13:10

## 2019-05-20 RX ADMIN — ACETAMINOPHEN 975 MG: 325 TABLET, FILM COATED ORAL at 20:31

## 2019-05-20 RX ADMIN — ROCURONIUM BROMIDE 10 MG: 10 INJECTION INTRAVENOUS at 08:18

## 2019-05-20 RX ADMIN — FENTANYL CITRATE 50 MCG: 50 INJECTION, SOLUTION INTRAMUSCULAR; INTRAVENOUS at 08:52

## 2019-05-20 RX ADMIN — PROPOFOL 150 MCG/KG/MIN: 10 INJECTION, EMULSION INTRAVENOUS at 07:55

## 2019-05-20 RX ADMIN — FENTANYL CITRATE 50 MCG: 50 INJECTION, SOLUTION INTRAMUSCULAR; INTRAVENOUS at 11:49

## 2019-05-20 RX ADMIN — VECURONIUM BROMIDE 2 MG: 1 INJECTION, POWDER, LYOPHILIZED, FOR SOLUTION INTRAVENOUS at 08:36

## 2019-05-20 RX ADMIN — SODIUM CHLORIDE: 9 INJECTION, SOLUTION INTRAVENOUS at 22:27

## 2019-05-20 RX ADMIN — GLYCOPYRROLATE 0.4 MG: 0.2 INJECTION, SOLUTION INTRAMUSCULAR; INTRAVENOUS at 10:20

## 2019-05-20 RX ADMIN — LIDOCAINE HYDROCHLORIDE 100 MG: 20 INJECTION, SOLUTION INFILTRATION; PERINEURAL at 07:55

## 2019-05-20 RX ADMIN — OXYCODONE HYDROCHLORIDE 5 MG: 5 TABLET ORAL at 15:49

## 2019-05-20 RX ADMIN — DEXAMETHASONE SODIUM PHOSPHATE 4 MG: 4 INJECTION, SOLUTION INTRA-ARTICULAR; INTRALESIONAL; INTRAMUSCULAR; INTRAVENOUS; SOFT TISSUE at 08:10

## 2019-05-20 RX ADMIN — VECURONIUM BROMIDE 1 MG: 1 INJECTION, POWDER, LYOPHILIZED, FOR SOLUTION INTRAVENOUS at 09:38

## 2019-05-20 RX ADMIN — CEFAZOLIN 1 G: 1 INJECTION, POWDER, FOR SOLUTION INTRAMUSCULAR; INTRAVENOUS at 10:12

## 2019-05-20 RX ADMIN — SODIUM CHLORIDE, POTASSIUM CHLORIDE, SODIUM LACTATE AND CALCIUM CHLORIDE: 600; 310; 30; 20 INJECTION, SOLUTION INTRAVENOUS at 11:45

## 2019-05-20 RX ADMIN — NEOSTIGMINE METHYLSULFATE 3 MG: 1 INJECTION, SOLUTION INTRAVENOUS at 10:20

## 2019-05-20 RX ADMIN — MIDAZOLAM 2 MG: 1 INJECTION INTRAMUSCULAR; INTRAVENOUS at 07:46

## 2019-05-20 RX ADMIN — PROPOFOL 200 MG: 10 INJECTION, EMULSION INTRAVENOUS at 07:55

## 2019-05-20 RX ADMIN — FENTANYL CITRATE 100 MCG: 50 INJECTION, SOLUTION INTRAMUSCULAR; INTRAVENOUS at 07:55

## 2019-05-20 RX ADMIN — ONDANSETRON 4 MG: 2 INJECTION INTRAMUSCULAR; INTRAVENOUS at 10:22

## 2019-05-20 RX ADMIN — ROCURONIUM BROMIDE 40 MG: 10 INJECTION INTRAVENOUS at 07:55

## 2019-05-20 RX ADMIN — SODIUM CHLORIDE, POTASSIUM CHLORIDE, SODIUM LACTATE AND CALCIUM CHLORIDE: 600; 310; 30; 20 INJECTION, SOLUTION INTRAVENOUS at 07:45

## 2019-05-20 ASSESSMENT — LIFESTYLE VARIABLES: TOBACCO_USE: 0

## 2019-05-20 ASSESSMENT — COPD QUESTIONNAIRES: COPD: 0

## 2019-05-20 ASSESSMENT — MIFFLIN-ST. JEOR: SCORE: 1262.98

## 2019-05-20 ASSESSMENT — ENCOUNTER SYMPTOMS: SEIZURES: 0

## 2019-05-20 NOTE — PLAN OF CARE
A&Ox4 on RA. C/o L low abdominal/flank pain, PRN oxycodone x1. Intermittent nausea, zofran x1. Clear liquid diet, good appetite. BS hypoactive, denies passing flatus. Encouraged ambulation, refusing at this time d/t pain and family visiting. Chaudhari patent with watermelon colored urine. LS clear, IS use encouraged. Urology to round in AM and probable discharge home after TOV.

## 2019-05-20 NOTE — ANESTHESIA PREPROCEDURE EVALUATION
Anesthesia Pre-Procedure Evaluation    Patient: Mel Reeves   MRN: [id number] : 1970          Preoperative Diagnosis: LEFT KIDNEY STONES    Procedure(s):  LEFT PERCUTANEOUS NEPHROLITHOTOMY    Past Medical History:   Diagnosis Date     Kidney stones      Migraines      Motion sickness      Osteogenesis imperfecta      Past Surgical History:   Procedure Laterality Date     ABDOMEN SURGERY      CS     APPENDECTOMY       COMBINED CYSTOSCOPY, RETROGRADES, URETEROSCOPY, LASER HOLMIUM LITHOTRIPSY URETER(S), INSERT STENT Right 2019    Procedure: CYSTOSCOPY, RIGHT RETROGRADES, PYELOGRAM, RIGHT URETEROSCOPY, LASER HOLMIUM LITHOTRIPSY BASKET REMOVAL OF STONES AND RIGHT STENT PLACEMENT;  Surgeon: Warner Ramirez MD;  Location: [facility] OR     CYSTOSCOPY      stone removal     GYN SURGERY         Anesthesia Evaluation     . Pt has had prior anesthetic. Type: General    History of anesthetic complications   - PONV        ROS/MED HX    ENT/Pulmonary:      (-) tobacco use, asthma, COPD and sleep apnea   Neurologic: Comment: Motion sickness    (+)migraines,    (-) seizures, CVA, TIA, Dementia and Neuropathy   Cardiovascular:        (-) hypertension and CAD   METS/Exercise Tolerance:     Hematologic:  - neg hematologic  ROS       Musculoskeletal:   (+)  other musculoskeletal- osteogenesis imperfecta      GI/Hepatic:        (-) GERD and liver disease   Renal/Genitourinary:     (+) Nephrolithiasis ,    (-) renal disease   Endo:      (-) Type I DM and Type II DM   Psychiatric:         Infectious Disease:         Malignancy:         Other:                          Physical Exam  Normal systems: cardiovascular and pulmonary    Airway   Mallampati: I  TM distance: >3 FB  Neck ROM: full    Dental   (+) missing    Cardiovascular       Pulmonary             Lab Results   Component Value Date    WBC 12.6 (H) 2019    HGB 13.2 2019    HCT 39.7 2019     2019     2019    POTASSIUM 3.3 (L)  "04/02/2019    CHLORIDE 104 04/02/2019    CO2 25 04/02/2019    BUN 13 04/02/2019    CR 0.69 04/02/2019     (H) 04/02/2019    MINI 8.9 04/02/2019    ALBUMIN 4.1 04/02/2019    PROTTOTAL 7.6 04/02/2019    ALT 13 04/02/2019    AST 15 04/02/2019    ALKPHOS 63 04/02/2019    BILITOTAL 0.3 04/02/2019    LIPASE 159 04/02/2019    HCG Negative 04/08/2019    HCGS Negative 04/02/2019       Preop Vitals  BP Readings from Last 3 Encounters:   04/15/19 120/70   04/08/19 115/70   04/05/19 116/86    Pulse Readings from Last 3 Encounters:   04/15/19 87   04/08/19 81   04/05/19 90      Resp Readings from Last 3 Encounters:   04/08/19 14   04/02/19 18    SpO2 Readings from Last 3 Encounters:   04/15/19 99%   04/08/19 100%   04/05/19 100%      Temp Readings from Last 1 Encounters:   04/08/19 36.8  C (98.2  F)    Ht Readings from Last 1 Encounters:   04/15/19 1.651 m (5' 5\")      Wt Readings from Last 1 Encounters:   04/15/19 63.5 kg (140 lb)    Estimated body mass index is 23.3 kg/m  as calculated from the following:    Height as of 4/15/19: 1.651 m (5' 5\").    Weight as of 4/15/19: 63.5 kg (140 lb).       Anesthesia Plan      History & Physical Review  History and physical reviewed and following examination; no interval change.    ASA Status:  2 .    NPO Status:  > 8 hours    Plan for General and ETT with Intravenous induction. Maintenance will be TIVA.    PONV prophylaxis:  Ondansetron (or other 5HT-3) and Dexamethasone or Solumedrol  Additional equipment: Videolaryngoscope videolaryngoscope  Slow and careful positioning to prevent fractures  Propofol with nitrous for GA        Postoperative Care  Postoperative pain management:  Multi-modal analgesia.      Consents  Anesthetic plan, risks, benefits and alternatives discussed with:  Patient.  Use of blood products discussed: Yes.   Use of blood products discussed with Patient.  Consented to blood products.  .                 Scott Mccollum MD  "

## 2019-05-20 NOTE — PROGRESS NOTES
Admission medication history interview status for the 5/20/2019  admission is complete. See EPIC admission navigator for prior to admission medications     Medication history source reliability:Good    Medication history interview source(s):Patient    Medication history resources (including written lists, pill bottles, clinic record):None    Primary pharmacy    Additional medication history information not noted on PTA med list :None    Time spent in this activity: 45 minutes  Prior to Admission medications    Medication Sig Last Dose Taking? Auth Provider   calcium carbonate 500 mg, elemental, (OSCAL 500) 1250 (500 Ca) MG TABS tablet Take 1 tablet by mouth 2 times daily 5/18/2019 Yes Reported, Patient   Cholecalciferol (VITAMIN D PO) Take 1 tablet by mouth daily 5/16/2019 Yes Reported, Patient   guaiFENesin (MUCINEX PO) Take 2 capsules by mouth daily as needed 5/17/2019 at prn Yes Reported, Patient   SUMAtriptan (IMITREX) 50 MG tablet Take 50 mg by mouth at onset of headache for migraine 5/15/2019 Yes Reported, Patient

## 2019-05-20 NOTE — ANESTHESIA POSTPROCEDURE EVALUATION
Patient: Mel Reeves    Procedure(s):  LEFT PERCUTANEOUS NEPHROLITHOTOMY, ,  Cystoscopy, flexible  ureteroscopy, RETROGRADES, URETER STENT PLACENMENT.    Diagnosis:LEFT KIDNEY STONES  Diagnosis Additional Information: No value filed.    Anesthesia Type:  General, ETT    Note:  Anesthesia Post Evaluation    Patient location during evaluation: PACU  Patient participation: Able to fully participate in evaluation  Level of consciousness: awake  Pain management: adequate  Airway patency: patent  Cardiovascular status: acceptable  Respiratory status: acceptable  Hydration status: acceptable  PONV: none     Anesthetic complications: None          Last vitals:  Vitals:    05/20/19 1035 05/20/19 1040 05/20/19 1050   BP: 110/67 102/71 107/78   Pulse: 82 74 68   Resp: 16 9 13   Temp: 35.8  C (96.5  F)     SpO2: 100% 100% 100%         Electronically Signed By: Willis Dumont MD  May 20, 2019  10:57 AM

## 2019-05-20 NOTE — ANESTHESIA CARE TRANSFER NOTE
Patient: Mel Reeves    Procedure(s):  LEFT PERCUTANEOUS NEPHROLITHOTOMY, ,  Cystoscopy, flexible  ureteroscopy, RETROGRADES, URETER STENT PLACENMENT.    Diagnosis: LEFT KIDNEY STONES  Diagnosis Additional Information: No value filed.    Anesthesia Type:   General, ETT     Note:  Airway :Face Mask  Patient transferred to:PACU  Comments: Extubated awake in OR, exchanging well, to recovery, VSSHandoff Report: Identifed the Patient, Identified the Reponsible Provider, Reviewed the pertinent medical history, Discussed the surgical course, Reviewed Intra-OP anesthesia mangement and issues during anesthesia, Set expectations for post-procedure period and Allowed opportunity for questions and acknowledgement of understanding      Vitals: (Last set prior to Anesthesia Care Transfer)    CRNA VITALS  5/20/2019 1001 - 5/20/2019 1037      5/20/2019             Pulse:  87    SpO2:  100 %    Resp Rate (observed):  7  (Abnormal)                 Electronically Signed By: ARCELIA Lopez CRNA  May 20, 2019  10:37 AM

## 2019-05-20 NOTE — PLAN OF CARE
A/ox4. VSS, BP soft. Dressing on L flank with scant marked serosanguineous drainage. Pt c/o L abdominal and flank pain, decreased with scheduled tylenol and PRN dilaudid. Tolerating clear liquids so far. Scopalamine patch behind L ear. Not up yet, anticipate up with SBA, gait belt. Chaudhari patent-urine clear yellow/pink. Spouse, Jayesh, supportive at bedside. Probable discharge home tomorrow.

## 2019-05-20 NOTE — OR NURSING
PNDS Criteria met.  Order received per Dr. Dumont to transfer to [facility] for continued recovery.  Hand-off report to Samantha HOWARD.  To 813-2 per cart with all belongings.  Will transport with Capnography monitoring.

## 2019-05-20 NOTE — ADDENDUM NOTE
Addendum  created 05/20/19 1100 by Willis Dumont MD    Attestation recorded in Intraprocedure, Intraprocedure Attestations filed, Intraprocedure Event edited

## 2019-05-20 NOTE — OP NOTE
OPERATIVE REPORT  DATE OF SURGERY: 05/20/19  LOCATION OF SURGERY: [facility]  PREOPERATIVE DIAGNOSIS:  (N20.0) Calculus of left kidney  (primary encounter diagnosis)  POSTOPERATIVE DIAGNOSIS: (N20.0) Calculus of left kidney  (primary encounter diagnosis)  START TIME: 8:22 AM  END TIME: 10:20 AM  PROCEDURE PERFORMED:   1. Cystoscopy and LEFT retrograde pyelogram  2. LEFT ureteroscopy diagnostic  3. LEFT percutaneous renal access and tract dilation - Dr. Mazariegos portion   4. LEFT percutaneous nephrolithotomy - >2cm stone burden   5. LEFT JJ stent placement  6. >1hr physician fluoroscopy time      STAFF SURGEON: Warner Ramirez MD  ASSISTANT: Josie Mazariegos DO  ANESTHESIA: General.   ESTIMATED BLOOD LOSS: 50 mL.   DRAINS AND TUBES: LEFT 7fr x 24cm ureteral stent, 18fr hernandez catheter  COMPLICATIONS: None.   DISPOSITION: PACU.   SPECIMENS OBTAINED:   ID Type Source Tests Collected by Time Destination   1 : LEFT KIDNEY STONE Calculus/Stone Kidney, Left STONE ANALYSIS Warner Ramirez MD 5/20/2019  9:36 AM      SIGNIFICANT FINDINGS: Cystoscopy with no evidence of stone within the bladder. LEFT Retrograde Pyelogram with no evidence of stone within the ureter. LEFT Ureteroscopy with evidence of the large lower pole staghorn kidney stone. LEFT PCNL completed with removal of all visualized stone. LEFT ureteral stent placed.     HISTORY OF PRESENT ILLNESS:    Mrs. Reeves is a 48 year old 4/2 presented to the ER Right flank and abd. CT was obtained which demonstrated a 5x6mm RIGHT distal / UVJ stone with mild hydronephrosis and a large LEFT renal staghorn calculus. She underwent RIGHT Ureteroscopy with laser lithotripsy and stone removal on 4/8/19 with stent removal on 4/15/19. She presents today for treatment of her large LEFT staghorn kidney stone. She has history of osteogenesis imperfecta.     OPERATION PERFORMED:   Informed consent was obtained and the patient was brought to the operating room where general anesthesia  was induced. The patient was given appropriate preoperative antibiotics and positioned prone with care positioning and padding. We then performed a timeout, verifying the correct patient's site and procedure to be performed.    Bladder was drained with a 16 Occitan Chaudhari catheter.  Flexible cystoscope was inserted atraumatically into the bladder and no stones were noted within the bladder.  The left ureteral orifice was identified and cannulated with a 0.035 sensor wire which was advanced up to the renal pelvis under fluoroscopic guidance.  The large left lower pole staghorn stone was well visualized on fluoroscopy.  The cystoscope was removed and a dual-lumen 10 Occitan catheter was advanced over the sensor wire.  A gentle retrograde pyelogram was completed with no evidence of stones in the ureter and opacification of the left renal pelvis and collecting system.  An Amplatz superstiff wire was advanced through the dual-lumen catheter and the catheter was removed.  An 11-13 x 36 cm ureteral access sheath was placed over the Super Stiff wire under fluoroscopic guidance.  Flexible ureteroscope was assembled and inserted through the ureteral access sheath and advanced up to the renal pelvis.  The large lower pole stick when stone was identified, however the scope could not navigate to the end of a calyx due to the stone filling the entirety of the calyx.  Dr. Mazariegos performed left percutaneous renal access with through and through wire placement about the ureteral access sheath.  Dr. Mazariegos performed percutaneous renal tract dilation and sheath placement.  See her dictation for separate details.  These nephroscope was then inserted through the access sheath with difficulty identifying the renal pelvis.  Flexible nephroscope was also utilized again with difficulty identifying the renal pelvis.  Based on this Dr. Mazariegos advanced the  percutaneous access sheath over the balloon.  The rigid nephroscope was then  replaced into the renal pelvis and the lower pole was taken stone was identified.  The stone was extracted with the cyber wand with removal of all visually identified stone.  There is no evidence of large stone burden remaining on plain x-ray.  Due to the difficulty navigating the tract, complete visualization of the entire left collecting system was not able to be performed.  The ureteral access sheath was removed and a 7 Omani by 24 cm ureteral stent was placed over the sensor wire with good curl noted in the renal pelvis and in the bladder fluoroscopically.  All wires and the percutaneous renal access sheath was removed.  The incision was closed with 2-0 Prolene.  An 18 Omani Chaudhari was placed.  The patient received 20 mg of Lasix.  Patient was repositioned supine, extubated, and taken to the PACU in stable condition.    Warner Ramirez MD   Urology  [facility]  Clinic Phone [phone]

## 2019-05-21 VITALS
BODY MASS INDEX: 23.32 KG/M2 | WEIGHT: 140 LBS | RESPIRATION RATE: 16 BRPM | HEIGHT: 65 IN | HEART RATE: 64 BPM | OXYGEN SATURATION: 100 % | TEMPERATURE: 96.3 F | SYSTOLIC BLOOD PRESSURE: 101 MMHG | DIASTOLIC BLOOD PRESSURE: 55 MMHG

## 2019-05-21 DIAGNOSIS — N20.0 KIDNEY STONE ON LEFT SIDE: Primary | ICD-10-CM

## 2019-05-21 LAB
ANION GAP SERPL CALCULATED.3IONS-SCNC: 4 MMOL/L (ref 3–14)
BUN SERPL-MCNC: 6 MG/DL (ref 7–30)
CALCIUM SERPL-MCNC: 7.8 MG/DL (ref 8.5–10.1)
CHLORIDE SERPL-SCNC: 112 MMOL/L (ref 94–109)
CO2 SERPL-SCNC: 26 MMOL/L (ref 20–32)
COPATH REPORT: NORMAL
CREAT SERPL-MCNC: 0.6 MG/DL (ref 0.52–1.04)
ERYTHROCYTE [DISTWIDTH] IN BLOOD BY AUTOMATED COUNT: 13.1 % (ref 10–15)
GFR SERPL CREATININE-BSD FRML MDRD: >90 ML/MIN/{1.73_M2}
GLUCOSE SERPL-MCNC: 91 MG/DL (ref 70–99)
HCT VFR BLD AUTO: 28.9 % (ref 35–47)
HGB BLD-MCNC: 9.7 G/DL (ref 11.7–15.7)
MCH RBC QN AUTO: 27.6 PG (ref 26.5–33)
MCHC RBC AUTO-ENTMCNC: 33.6 G/DL (ref 31.5–36.5)
MCV RBC AUTO: 82 FL (ref 78–100)
PLATELET # BLD AUTO: 280 10E9/L (ref 150–450)
POTASSIUM SERPL-SCNC: 3.8 MMOL/L (ref 3.4–5.3)
RBC # BLD AUTO: 3.52 10E12/L (ref 3.8–5.2)
SODIUM SERPL-SCNC: 142 MMOL/L (ref 133–144)
WBC # BLD AUTO: 7.2 10E9/L (ref 4–11)

## 2019-05-21 PROCEDURE — 36415 COLL VENOUS BLD VENIPUNCTURE: CPT | Performed by: UROLOGY

## 2019-05-21 PROCEDURE — 80048 BASIC METABOLIC PNL TOTAL CA: CPT | Performed by: UROLOGY

## 2019-05-21 PROCEDURE — 25000132 ZZH RX MED GY IP 250 OP 250 PS 637: Performed by: UROLOGY

## 2019-05-21 PROCEDURE — 85027 COMPLETE CBC AUTOMATED: CPT | Performed by: UROLOGY

## 2019-05-21 RX ORDER — OXYBUTYNIN CHLORIDE 5 MG/1
5 TABLET, EXTENDED RELEASE ORAL DAILY
Qty: 14 TABLET | Refills: 0 | Status: SHIPPED | OUTPATIENT
Start: 2019-05-21 | End: 2019-05-29

## 2019-05-21 RX ADMIN — ACETAMINOPHEN 975 MG: 325 TABLET, FILM COATED ORAL at 01:54

## 2019-05-21 RX ADMIN — ACETAMINOPHEN 975 MG: 325 TABLET, FILM COATED ORAL at 09:06

## 2019-05-21 RX ADMIN — OXYCODONE HYDROCHLORIDE 5 MG: 5 TABLET ORAL at 11:06

## 2019-05-21 NOTE — PROGRESS NOTES
[facility] Urology Progress Note          Assessment and Plan:   Active Problems:    Kidney stone on left side    Assessment: POD 1   1. Cystoscopy and LEFT retrograde pyelogram  2. LEFT ureteroscopy diagnostic  3. LEFT percutaneous renal access and tract dilation - Dr. Mazariegos portion   4. LEFT percutaneous nephrolithotomy - >2cm stone burden   5. LEFT JJ stent placement  6. >1hr physician fluoroscopy time         Plan: Discontinue hernandez  Home after voids and PVR <200cc  F/U for stent removal      Norma Anthony PA-C  [facility]rology  [phone]               Interval History:   doing well; no cp, sob, n/v/d, or abd pain. Hernandez clear              Review of Systems:   The 5 point Review of Systems is negative other than noted in the HPI             Medications:     Current Facility-Administered Medications Ordered in Epic   Medication Dose Route Frequency Last Rate Last Dose     acetaminophen (TYLENOL) tablet 975 mg  975 mg Oral Q6H   975 mg at 05/21/19 0154     HYDROmorphone (PF) (DILAUDID) injection 0.2 mg  0.2 mg Intravenous Q2H PRN   0.2 mg at 05/20/19 1310     lidocaine (LMX4) cream   Topical Q1H PRN         lidocaine 1 % 0.1-1 mL  0.1-1 mL Other Q1H PRN         naloxone (NARCAN) injection 0.1-0.4 mg  0.1-0.4 mg Intravenous Q2 Min PRN         ondansetron (ZOFRAN-ODT) ODT tab 4 mg  4 mg Oral Q6H PRN   4 mg at 05/20/19 1552    Or     ondansetron (ZOFRAN) injection 4 mg  4 mg Intravenous Q6H PRN         oxyCODONE (ROXICODONE) tablet 5-10 mg  5-10 mg Oral Q3H PRN   5 mg at 05/20/19 1939     prochlorperazine (COMPAZINE) injection 10 mg  10 mg Intravenous Q6H PRN        Or     prochlorperazine (COMPAZINE) tablet 10 mg  10 mg Oral Q6H PRN         scopolamine (TRANSDERM-SCOP) patch REMOVAL   Transdermal Once         sodium chloride (PF) 0.9% PF flush 3 mL  3 mL Intracatheter q1 min prn         sodium chloride (PF) 0.9% PF flush 3 mL  3 mL Intracatheter Q8H         sodium chloride 0.9% infusion    Intravenous Continuous 100 mL/hr at 05/20/19 2227       Current Outpatient Medications Ordered in Epic   Medication     oxybutynin ER (DITROPAN-XL) 5 MG 24 hr tablet                  Physical Exam:   Vitals were reviewed  Patient Vitals for the past 8 hrs:   BP Heart Rate Resp SpO2   05/21/19 0719 101/55 72 16 100 %     GEN: NAD, lying in bed  EYES: EOMI  MOUTH: MMM  NECK: Supple  RESP: Unlabored breathing  CARDIAC: No LE edema  SKIN: Warm  ABD: soft  NEURO: AAO  : clear           Data:   No results found for: NTBNPI, NTBNP  Lab Results   Component Value Date    WBC 7.2 05/21/2019    WBC 7.1 05/20/2019    WBC 12.6 (H) 04/02/2019    HGB 9.7 (L) 05/21/2019    HGB 12.3 05/20/2019    HGB 13.2 04/02/2019    HCT 28.9 (L) 05/21/2019    HCT 37.4 05/20/2019    HCT 39.7 04/02/2019    MCV 82 05/21/2019    MCV 81 05/20/2019    MCV 83 04/02/2019     05/21/2019     05/20/2019     04/02/2019     No results found for: INR

## 2019-05-21 NOTE — PROGRESS NOTES
Patient A/ox4. VSS on RA. C/o minimal L back pain from surgery, scheduled Tylenol given, effective for patient. Dressing to L back with marked dried drainage, within boarders/no change. Regular diet, tolerated cream of wheat and pudding for breakfast. Intermittent nausea at times, sea band in place. Hernandez catheter removed 0835, patient having urinary frequency after hernandez removal, urine pink with small pink clot. PVR <200 x2, adequate for discharge. AVS packet reviewed with patient at bedside with spouse. All questions answered. Oxybutynin med filled and given to patient. Follow-up appt scheduled for stent removal next week, patient aware. PIV removed. Personal belongings accounted for and sent with patient. Patient requested Oxycodone right before discharge. Patient sent down via wheelchair by escort service to  door. Patient discharged in stable condition.

## 2019-05-21 NOTE — DISCHARGE INSTRUCTIONS
POSTOPERATIVE INSTRUCTIONS    Diagnosis-------------------------------   LEFT kidney stones    Procedure-------------------------------  Procedure(s) (LRB):  LEFT PERCUTANEOUS NEPHROLITHOTOMY, , (Left)  Cystoscopy, flexible  ureteroscopy, RETROGRADES, URETER STENT PLACENMENT. (Left)      Findings--------------------------------  LEFT lower pole staghorn kidney stone    Home-going instructions-----------------         Activity Limitation:     - No driving or operating heavy machinery while on narcotic pain medication.     FOLLOW THESE INSTRUCTIONS AS INDICATED BELOW:  - Observe operative area for signs of excessive bleeding.  - You may shower.  - Increase fluid intake to promote clear urine.  - Resume usual diet as tolerated    What to expect while recovering-----------  - You may experience some intermittent bleeding that makes your urine pink or cherry colored. This is normal.  - However, if you are unable to urinate, passing large amount of clots, have gayle blood in your urine, or have a temperature >101 degrees, call the urology nurse on call, or present to your nearest emergency department.  - You are encouraged to walk daily, and have no activity restrictions.   - A URETERAL STENT has been placed that allows urine to flow unobstructed from your kidney into your bladder.  The stent has a curl in the kidney and a curl in the bladder.  The curl in the bladder can cause some urgency and frequency of urination as well as some mild blood in the urine.  The curl in the kidney can cause some mild flank discomfort.  This may be more noticeable when you urinate.  A URETERAL STENT is meant to be left in temporarily.  It must be removed or changed no later than 3 months after it's insertion.  If it's not removed it can result in stone overgrowth on the stent that can cause pain, infection, and can be very difficult to remove.      Discharge Medications/instructions:     - Flomax (tamsulosin) to be taken daily until stent  is removed    - Ditropan to be taken daily until stent is removed    - Take Tylenol 1000mg every 6 hours for pain    - Take Toradol 10mg every 6 hours as needed for additional pain control    - Take Oxycodone 5mg every 4-6 hours only for break through pain    - Take Colace while taking Oxycodone to prevent constipation      Questions/concerns------------------------  [facility]: [phone]  [facility]: [phone]    Future appointments  Follow up is scheduled for 5/29/19 and will plan for CT Abd/Pel stone protocol prior to stent removal.    Warner Ramirez MD

## 2019-05-21 NOTE — PLAN OF CARE
A/O. VSS on RA. Pain managed with scheduled tylenol. BS hypoactive, no flatus. Tolerating full liquid diet. Ambulated x2, SBA. Dressing marked and unchanged. Chaudhari patent with light pink/yellow output. PIV infusing  ml/hr. Possible discharge home today after TOV.

## 2019-05-21 NOTE — DISCHARGE SUMMARY
[facility] Discharge Summary: Urology    Mel Reeves MRN# [id number]   Age: 49 year old YOB: 1970     Date of Admission:  5/20/2019  Date of Discharge::  5/21/2019  Admitting Physician:  Warner Ramirez MD  Discharge Physician:  Norma Anthony PA-C, HARME[facility]           Admission Diagnoses:      Stone in kidney  Calculus of left kidney          Discharge Diagnosis:   Same          Procedures:   1. Cystoscopy and LEFT retrograde pyelogram  2. LEFT ureteroscopy diagnostic  3. LEFT percutaneous renal access and tract dilation - Dr. Mazariegos portion   4. LEFT percutaneous nephrolithotomy - >2cm stone burden   5. LEFT JJ stent placement  6. >1hr physician fluoroscopy time           Medications Prior to Admission:     Medications Prior to Admission   Medication Sig Dispense Refill Last Dose     calcium carbonate 500 mg, elemental, (OSCAL 500) 1250 (500 Ca) MG TABS tablet Take 1 tablet by mouth 2 times daily   5/18/2019     Cholecalciferol (VITAMIN D PO) Take 1 tablet by mouth daily   5/16/2019     guaiFENesin (MUCINEX PO) Take 2 capsules by mouth daily as needed   5/17/2019 at prn     SUMAtriptan (IMITREX) 50 MG tablet Take 50 mg by mouth at onset of headache for migraine   5/15/2019             Discharge Medications:     Current Discharge Medication List      START taking these medications    Details   oxybutynin ER (DITROPAN-XL) 5 MG 24 hr tablet Take 1 tablet (5 mg) by mouth daily for 14 days Take daily until the day prior to hernandez removal.  Qty: 14 tablet, Refills: 0    Associated Diagnoses: Calculus of left kidney         CONTINUE these medications which have NOT CHANGED    Details   calcium carbonate 500 mg, elemental, (OSCAL 500) 1250 (500 Ca) MG TABS tablet Take 1 tablet by mouth 2 times daily      Cholecalciferol (VITAMIN D PO) Take 1 tablet by mouth daily      guaiFENesin (MUCINEX PO) Take 2 capsules by mouth daily as needed      SUMAtriptan (IMITREX) 50 MG tablet Take 50 mg by mouth at onset  of headache for migraine                   Consultations:     None          Hospital Course:     The patient underwent the above procedure and tolerated this well. Uncomplicated post operative course. Had adequate pain control, ambulating and tolerating regular diet at the time of discharge.            Discharge Instructions and Follow-Up:   Discharge diet: Regular   Discharge activity: No lifting >10lbs or strenuous exercise for 4 week(s)   Discharge follow-up: Dr. Ramirez for stent out    Wound care: Ice to area for comfort  Keep wound clean and dry           Discharge Disposition:   Discharged to home        Norma Anthony PA-C  [facility]

## 2019-05-23 LAB
APPEARANCE STONE: NORMAL
COMPN STONE: NORMAL
NUMBER STONE: NORMAL
SIZE STONE: NORMAL MM
WT STONE: 241 MG

## 2019-05-29 ENCOUNTER — HOSPITAL ENCOUNTER (OUTPATIENT)
Dept: CT IMAGING | Facility: CLINIC | Age: 49
Discharge: HOME | End: 2019-05-29
Attending: UROLOGY | Admitting: UROLOGY
Payer: COMMERCIAL

## 2019-05-29 ENCOUNTER — OFFICE VISIT (OUTPATIENT)
Dept: UROLOGY | Facility: CLINIC | Age: 49
End: 2019-05-29
Payer: COMMERCIAL

## 2019-05-29 VITALS
WEIGHT: 140 LBS | DIASTOLIC BLOOD PRESSURE: 68 MMHG | HEIGHT: 64 IN | SYSTOLIC BLOOD PRESSURE: 106 MMHG | HEART RATE: 94 BPM | OXYGEN SATURATION: 99 % | BODY MASS INDEX: 23.9 KG/M2

## 2019-05-29 DIAGNOSIS — N20.0 CALCULUS OF LEFT KIDNEY: Primary | ICD-10-CM

## 2019-05-29 DIAGNOSIS — N20.0 KIDNEY STONE ON LEFT SIDE: ICD-10-CM

## 2019-05-29 PROCEDURE — 74176 CT ABD & PELVIS W/O CONTRAST: CPT

## 2019-05-29 PROCEDURE — 99214 OFFICE O/P EST MOD 30 MIN: CPT | Performed by: UROLOGY

## 2019-05-29 RX ORDER — TAMSULOSIN HYDROCHLORIDE 0.4 MG/1
CAPSULE ORAL
Refills: 0 | COMMUNITY
Start: 2019-05-14 | End: 2021-10-26

## 2019-05-29 RX ORDER — OXYBUTYNIN CHLORIDE 5 MG/1
5 TABLET, EXTENDED RELEASE ORAL DAILY
Qty: 28 TABLET | Refills: 0 | Status: SHIPPED | OUTPATIENT
Start: 2019-05-29 | End: 2021-10-26

## 2019-05-29 RX ORDER — CIPROFLOXACIN 500 MG/1
500 TABLET, FILM COATED ORAL ONCE
Qty: 1 TABLET | Refills: 0 | Status: SHIPPED | OUTPATIENT
Start: 2019-05-29 | End: 2019-05-29

## 2019-05-29 RX ORDER — KETOROLAC TROMETHAMINE 10 MG/1
TABLET, FILM COATED ORAL
Status: ON HOLD | COMMUNITY
Start: 2019-04-08 | End: 2019-06-19

## 2019-05-29 ASSESSMENT — PAIN SCALES - GENERAL: PAINLEVEL: NO PAIN (0)

## 2019-05-29 ASSESSMENT — MIFFLIN-ST. JEOR: SCORE: 1255.04

## 2019-05-29 NOTE — LETTER
"5/29/2019       RE: Mel Reeves  [address]  [place] [id number]     Dear Colleague,    Thank you for referring your patient, Mel Reeves, to the [facility] [place] [facility]. Please see a copy of my visit note below.    [facility]  CHIEF COMPLAINT   It was my pleasure to see Mel Reeves who is a 49 year old female for follow-up of LEFT staghorn kidney stone.      HPI   Mel Reeves is a very pleasant 49 year old female who presents with a history of LEFT staghorn kidney stone.     She was initially seen by me on 4/5/19:  \"4/2 presented to the ER Right flank and abd. She reports that this pain started a few days prior and gradually worsened. Pain is located in the RIGHT flank with radiation to the RIGHT lower quadrant. Pain is sharp, intermittent in nature. She denies associated fever, chills, nausea or vomiting.   CT was obtained which demonstrated a 5x6mm RIGHT distal / UVJ stone with mild hydronephrosis and a large LEFT renal staghorn calculus. She has been managing pain with Tylenol and Ibuprofen.      Has history of stones about 18 years ago during pregnancy for which she had a ureteral stent and then Ureteroscopy.\"    She subsequently underwent RIGHT Ureteroscopy on 4/8/19 for stone removal and her ureteral stent was removed on 4/15/19. She was counseled on treatment options for her LEFT staghorn stone and underwent LEFT PCNL on 5/20/19. She presents today for suture removal and possible ureteral stent removal. A CT scan was obtained prior to the exam due to concern for residual fragments due to challenging renal access. CT scan demonstrates a significant residual stone burden with largest stone 1.4cm.     PHYSICAL EXAM  Patient is a 49 year old  female   Vitals: Blood pressure 106/68, pulse 94, height 1.626 m (5' 4\"), weight 63.5 kg (140 lb), last menstrual period 05/15/2019, SpO2 99 %.  General Appearance Adult: Body mass " index is 24.03 kg/m .  Alert, no acute distress, oriented  HENT: throat/mouth:normal, good dentition  Lungs: no respiratory distress, or pursed lip breathing  Heart: No obvious jugular venous distension present  Abdomen: soft, nontender, no organomegaly or masses  Back: LEFT stitch removed, incision well healed   Musculoskeltal: extremities normal, no peripheral edema  Skin: no suspicious lesions or rashes  Neuro: Alert, oriented, speech and mentation normal  Psych: affect and mood normal  Gait: Normal    UA RESULTS:  Recent Labs   Lab Test 04/05/19  1556 04/02/19  1245   COLOR Yellow Yellow   APPEARANCE Clear Slightly Cloudy   URINEGLC Negative Negative   URINEBILI Negative Negative   URINEKETONE Negative 10*   SG <=1.005 1.019   UBLD Small* Large*   URINEPH 7.0 7.5*   PROTEIN Negative 30*   UROBILINOGEN 0.2  --    NITRITE Negative Negative   LEUKEST Small* Moderate*   RBCU  --  >182*   WBCU  --  21*        Stone analysis:  Brown calculi fragments composed primarily of:   80% calcium oxalate monohydrate, and   20% calcium phosphate (hydroxy- and carbonate- apatite).   Tan calculi fragments composed primarily of:   50% calcium monohydrogen phosphate dihydrate (brushite), and   50% calcium phosphate (hydroxy- and carbonate- apatite).     All pertinent imaging reviewed:    All imaging studies reviewed by me.  I personally reviewed these imaging films.   I reviewed today's CT and her CT from 4/2/19 with the patient.  A formal report from radiology will follow.    FINDINGS: Left ureteral stent in place. No definite stone along the  course of the stent. Multiple fragments of stone seen in the left  kidney. Largest individual fragment measures 1.4 cm. Calcification is  seen posteriorly to the kidney, possibly an extruded stone. There is  minimal left perinephric fat stranding. Kidneys are normal in size and  configuration.  No free air or free fluid. No bowel obstruction. Liver  unremarkable. Gallbladder unremarkable.  No  splenomegaly.  No definite  adrenal nodules.  Pancreas grossly unremarkable. The remainder of the  visualized abdomen is unremarkable on this noncontrast scan. Survey of  the visualized bony structures demonstrates no destructive bony  lesions.   The visualized lung bases are unremarkable.                                                                      IMPRESSION: Left ureteral stent with multiple stone fragments seen  scattered throughout the mid and lower left kidney. Largest individual  fragment remaining is 1.4 cm. No definite stones along the course of  the left ureteral stent. No hydronephrosis.       ASSESSMENT and PLAN  49 year old female with history of RIGHT ureteral stone now s/p Ureteroscopy and LEFT staghorn kidney stone s/p PCNL on 5/20/19 with evidence of significant residual stone burden on CT.    LEFT kidney stones  - We discussed that the CT scan does show significant residual stone burden in the left lower pole.  Her PCNL access have been challenging, and I was concerned about residual stone burden.  We discussed treatment options for the stones, which include repeat PCNL versus ureteroscopy.  She would rather undergo ureteroscopy, and this is a very reasonable approach given the stone size and the stent placement.  We will plan to keep the stent in place until the time of surgery and will work to schedule ureteroscopy about 4 weeks following her PCNL.  This will ensure that the PCNL tract has had adequate time to heal.  - Orders for surgery placed  - Suture removed from her left flank      I spent over 25 minutes with the patient.  Over half this time was spent on counseling regarding the above.    Warner Ramirze   Urology  [facility]  Clinic Phone [phone]

## 2019-05-29 NOTE — PROGRESS NOTES
"[facility]  CHIEF COMPLAINT   It was my pleasure to see Mel Reeves who is a 49 year old female for follow-up of LEFT staghorn kidney stone.      HPI   Mel Reeves is a very pleasant 49 year old female who presents with a history of LEFT staghorn kidney stone.     She was initially seen by me on 4/5/19:  \"4/2 presented to the ER Right flank and abd. She reports that this pain started a few days prior and gradually worsened. Pain is located in the RIGHT flank with radiation to the RIGHT lower quadrant. Pain is sharp, intermittent in nature. She denies associated fever, chills, nausea or vomiting.   CT was obtained which demonstrated a 5x6mm RIGHT distal / UVJ stone with mild hydronephrosis and a large LEFT renal staghorn calculus. She has been managing pain with Tylenol and Ibuprofen.      Has history of stones about 18 years ago during pregnancy for which she had a ureteral stent and then Ureteroscopy.\"    She subsequently underwent RIGHT Ureteroscopy on 4/8/19 for stone removal and her ureteral stent was removed on 4/15/19. She was counseled on treatment options for her LEFT staghorn stone and underwent LEFT PCNL on 5/20/19. She presents today for suture removal and possible ureteral stent removal. A CT scan was obtained prior to the exam due to concern for residual fragments due to challenging renal access. CT scan demonstrates a significant residual stone burden with largest stone 1.4cm.     PHYSICAL EXAM  Patient is a 49 year old  female   Vitals: Blood pressure 106/68, pulse 94, height 1.626 m (5' 4\"), weight 63.5 kg (140 lb), last menstrual period 05/15/2019, SpO2 99 %.  General Appearance Adult: Body mass index is 24.03 kg/m .  Alert, no acute distress, oriented  HENT: throat/mouth:normal, good dentition  Lungs: no respiratory distress, or pursed lip breathing  Heart: No obvious jugular venous distension present  Abdomen: soft, nontender, no organomegaly or masses  Back: LEFT stitch removed, incision well " healed   Musculoskeltal: extremities normal, no peripheral edema  Skin: no suspicious lesions or rashes  Neuro: Alert, oriented, speech and mentation normal  Psych: affect and mood normal  Gait: Normal    UA RESULTS:  Recent Labs   Lab Test 04/05/19  1556 04/02/19  1245   COLOR Yellow Yellow   APPEARANCE Clear Slightly Cloudy   URINEGLC Negative Negative   URINEBILI Negative Negative   URINEKETONE Negative 10*   SG <=1.005 1.019   UBLD Small* Large*   URINEPH 7.0 7.5*   PROTEIN Negative 30*   UROBILINOGEN 0.2  --    NITRITE Negative Negative   LEUKEST Small* Moderate*   RBCU  --  >182*   WBCU  --  21*        Stone analysis:  Brown calculi fragments composed primarily of:   80% calcium oxalate monohydrate, and   20% calcium phosphate (hydroxy- and carbonate- apatite).   Tan calculi fragments composed primarily of:   50% calcium monohydrogen phosphate dihydrate (brushite), and   50% calcium phosphate (hydroxy- and carbonate- apatite).     All pertinent imaging reviewed:    All imaging studies reviewed by me.  I personally reviewed these imaging films.   I reviewed today's CT and her CT from 4/2/19 with the patient.  A formal report from radiology will follow.    FINDINGS: Left ureteral stent in place. No definite stone along the  course of the stent. Multiple fragments of stone seen in the left  kidney. Largest individual fragment measures 1.4 cm. Calcification is  seen posteriorly to the kidney, possibly an extruded stone. There is  minimal left perinephric fat stranding. Kidneys are normal in size and  configuration.  No free air or free fluid. No bowel obstruction. Liver  unremarkable. Gallbladder unremarkable.  No splenomegaly.  No definite  adrenal nodules.  Pancreas grossly unremarkable. The remainder of the  visualized abdomen is unremarkable on this noncontrast scan. Survey of  the visualized bony structures demonstrates no destructive bony  lesions.   The visualized lung bases are unremarkable.                                                                       IMPRESSION: Left ureteral stent with multiple stone fragments seen  scattered throughout the mid and lower left kidney. Largest individual  fragment remaining is 1.4 cm. No definite stones along the course of  the left ureteral stent. No hydronephrosis.       ASSESSMENT and PLAN  49 year old female with history of RIGHT ureteral stone now s/p Ureteroscopy and LEFT staghorn kidney stone s/p PCNL on 5/20/19 with evidence of significant residual stone burden on CT.    LEFT kidney stones  - We discussed that the CT scan does show significant residual stone burden in the left lower pole.  Her PCNL access have been challenging, and I was concerned about residual stone burden.  We discussed treatment options for the stones, which include repeat PCNL versus ureteroscopy.  She would rather undergo ureteroscopy, and this is a very reasonable approach given the stone size and the stent placement.  We will plan to keep the stent in place until the time of surgery and will work to schedule ureteroscopy about 4 weeks following her PCNL.  This will ensure that the PCNL tract has had adequate time to heal.  - Orders for surgery placed  - Suture removed from her left flank      I spent over 25 minutes with the patient.  Over half this time was spent on counseling regarding the above.    Warner Ramirez   Urology  [facility]  Clinic Phone [phone]

## 2019-05-29 NOTE — NURSING NOTE
Chief Complaint   Patient presents with     Cystoscopy     Pt here for stent removal     Prior to the start of the procedure and with procedural staff participation, I verbally confirmed the patient s identity using two indicators, relevant allergies, that the procedure was appropriate and matched the consent or emergent situation, and that the correct equipment/implants were available. Immediately prior to starting the procedure I conducted the Time Out with the procedural staff and re-confirmed the patient s name, procedure, and site/side. I have wiped the patient off with the povidone-Iodine solution, draped them,  used Lidocaine hydrochloride jelly, and instilled sterile water into the bladder. (The Joint Commission universal protocol was followed.)  Yes    Sedation (Moderate or Deep): None  Shavonen Rivera CMA

## 2019-06-05 ENCOUNTER — TELEPHONE (OUTPATIENT)
Dept: UROLOGY | Facility: CLINIC | Age: 49
End: 2019-06-05

## 2019-06-05 NOTE — TELEPHONE ENCOUNTER
Urology pt of Dr. Ramirez s/p (L) percutaneous nephrolithotomy and  stent placement on 5/20/2019 and was seen in f/u 5/29. Mel calls today with multiple, detailed questions about her specific situation and requests to speak with provider. This request forwarded to Dr. Ramirez via In Basket message.

## 2019-06-05 NOTE — TELEPHONE ENCOUNTER
----- Message from Warner Ramirez MD sent at 6/5/2019  3:15 PM CDT -----  Regarding: RE: pt has multiple questions  Contact: [phone]  Ok, called her and had a long discussion answering all her questions.   Reba,  Warner Ramirez MD  ----- Message -----  From: Lexis Day RN  Sent: 6/5/2019   9:41 AM  To: Warner Ramirez MD  Subject: pt has multiple questions                        MariposarosamariaMel is s/p (L) percutaneous nephrolithotomy and stent placement on 5/20 and was seen in f/u 5/29. She calls today with multiple, detailed questions about her specific situation and requests to talk with you. Please call pt when possible after 3:00 PM today. She can be reached at [phone].   Thank you,  Lexis

## 2019-06-15 DIAGNOSIS — N20.1 RIGHT URETERAL STONE: ICD-10-CM

## 2019-06-17 RX ORDER — TAMSULOSIN HYDROCHLORIDE 0.4 MG/1
CAPSULE ORAL
Qty: 30 CAPSULE | Refills: 0 | Status: ON HOLD | OUTPATIENT
Start: 2019-06-17 | End: 2019-06-19

## 2019-06-19 ENCOUNTER — ANESTHESIA (OUTPATIENT)
Dept: SURGERY | Facility: CLINIC | Age: 49
End: 2019-06-19
Payer: COMMERCIAL

## 2019-06-19 ENCOUNTER — HOSPITAL ENCOUNTER (OUTPATIENT)
Facility: CLINIC | Age: 49
Discharge: HOME | End: 2019-06-19
Attending: UROLOGY | Admitting: UROLOGY
Payer: COMMERCIAL

## 2019-06-19 ENCOUNTER — APPOINTMENT (OUTPATIENT)
Dept: GENERAL RADIOLOGY | Facility: CLINIC | Age: 49
End: 2019-06-19
Attending: UROLOGY
Payer: COMMERCIAL

## 2019-06-19 ENCOUNTER — ANESTHESIA EVENT (OUTPATIENT)
Dept: SURGERY | Facility: CLINIC | Age: 49
End: 2019-06-19
Payer: COMMERCIAL

## 2019-06-19 VITALS
DIASTOLIC BLOOD PRESSURE: 69 MMHG | HEART RATE: 89 BPM | WEIGHT: 141.4 LBS | TEMPERATURE: 99.2 F | BODY MASS INDEX: 24.14 KG/M2 | RESPIRATION RATE: 16 BRPM | HEIGHT: 64 IN | SYSTOLIC BLOOD PRESSURE: 111 MMHG | OXYGEN SATURATION: 98 %

## 2019-06-19 DIAGNOSIS — N20.0 KIDNEY STONE ON LEFT SIDE: Primary | ICD-10-CM

## 2019-06-19 LAB — HCG UR QL: NEGATIVE

## 2019-06-19 PROCEDURE — 37000008 ZZH ANESTHESIA TECHNICAL FEE, 1ST 30 MIN: Performed by: UROLOGY

## 2019-06-19 PROCEDURE — C1894 INTRO/SHEATH, NON-LASER: HCPCS | Performed by: UROLOGY

## 2019-06-19 PROCEDURE — 25800025 ZZH RX 258: Performed by: UROLOGY

## 2019-06-19 PROCEDURE — 88300 SURGICAL PATH GROSS: CPT | Performed by: UROLOGY

## 2019-06-19 PROCEDURE — 36000056 ZZH SURGERY LEVEL 3 1ST 30 MIN: Performed by: UROLOGY

## 2019-06-19 PROCEDURE — 25000128 H RX IP 250 OP 636: Performed by: UROLOGY

## 2019-06-19 PROCEDURE — C1758 CATHETER, URETERAL: HCPCS | Performed by: UROLOGY

## 2019-06-19 PROCEDURE — 81025 URINE PREGNANCY TEST: CPT | Performed by: ANESTHESIOLOGY

## 2019-06-19 PROCEDURE — C2617 STENT, NON-COR, TEM W/O DEL: HCPCS | Performed by: UROLOGY

## 2019-06-19 PROCEDURE — 25000125 ZZHC RX 250: Performed by: ANESTHESIOLOGY

## 2019-06-19 PROCEDURE — 82365 CALCULUS SPECTROSCOPY: CPT | Performed by: UROLOGY

## 2019-06-19 PROCEDURE — 40000170 ZZH STATISTIC PRE-PROCEDURE ASSESSMENT II: Performed by: UROLOGY

## 2019-06-19 PROCEDURE — C1769 GUIDE WIRE: HCPCS | Performed by: UROLOGY

## 2019-06-19 PROCEDURE — 27210794 ZZH OR GENERAL SUPPLY STERILE: Performed by: UROLOGY

## 2019-06-19 PROCEDURE — 25000566 ZZH SEVOFLURANE, EA 15 MIN: Performed by: UROLOGY

## 2019-06-19 PROCEDURE — 25800030 ZZH RX IP 258 OP 636: Performed by: NURSE ANESTHETIST, CERTIFIED REGISTERED

## 2019-06-19 PROCEDURE — 71000027 ZZH RECOVERY PHASE 2 EACH 15 MINS: Performed by: UROLOGY

## 2019-06-19 PROCEDURE — 52356 CYSTO/URETERO W/LITHOTRIPSY: CPT | Mod: 58 | Performed by: UROLOGY

## 2019-06-19 PROCEDURE — 71000012 ZZH RECOVERY PHASE 1 LEVEL 1 FIRST HR: Performed by: UROLOGY

## 2019-06-19 PROCEDURE — 37000009 ZZH ANESTHESIA TECHNICAL FEE, EACH ADDTL 15 MIN: Performed by: UROLOGY

## 2019-06-19 PROCEDURE — 40000277 XR SURGERY CARM FLUORO LESS THAN 5 MIN W STILLS

## 2019-06-19 PROCEDURE — 25500064 ZZH RX 255 OP 636: Performed by: UROLOGY

## 2019-06-19 PROCEDURE — 74420 UROGRAPHY RTRGR +-KUB: CPT | Mod: 26 | Performed by: UROLOGY

## 2019-06-19 PROCEDURE — 88300 SURGICAL PATH GROSS: CPT | Mod: 26 | Performed by: UROLOGY

## 2019-06-19 PROCEDURE — 25000128 H RX IP 250 OP 636: Performed by: NURSE ANESTHETIST, CERTIFIED REGISTERED

## 2019-06-19 PROCEDURE — 36000058 ZZH SURGERY LEVEL 3 EA 15 ADDTL MIN: Performed by: UROLOGY

## 2019-06-19 PROCEDURE — 25000125 ZZHC RX 250: Performed by: UROLOGY

## 2019-06-19 PROCEDURE — 25000125 ZZHC RX 250: Performed by: NURSE ANESTHETIST, CERTIFIED REGISTERED

## 2019-06-19 DEVICE — STENT URETERAL CONTOUR SOFT PERCUFLEX 6FRX22CM: Type: IMPLANTABLE DEVICE | Site: URETER | Status: FUNCTIONAL

## 2019-06-19 RX ORDER — FENTANYL CITRATE 50 UG/ML
25-50 INJECTION, SOLUTION INTRAMUSCULAR; INTRAVENOUS
Status: DISCONTINUED | OUTPATIENT
Start: 2019-06-19 | End: 2019-06-19 | Stop reason: HOSPADM

## 2019-06-19 RX ORDER — KETOROLAC TROMETHAMINE 30 MG/ML
INJECTION, SOLUTION INTRAMUSCULAR; INTRAVENOUS PRN
Status: DISCONTINUED | OUTPATIENT
Start: 2019-06-19 | End: 2019-06-19

## 2019-06-19 RX ORDER — SODIUM CHLORIDE, SODIUM LACTATE, POTASSIUM CHLORIDE, CALCIUM CHLORIDE 600; 310; 30; 20 MG/100ML; MG/100ML; MG/100ML; MG/100ML
INJECTION, SOLUTION INTRAVENOUS CONTINUOUS
Status: DISCONTINUED | OUTPATIENT
Start: 2019-06-19 | End: 2019-06-19 | Stop reason: HOSPADM

## 2019-06-19 RX ORDER — HYDROMORPHONE HYDROCHLORIDE 1 MG/ML
.3-.5 INJECTION, SOLUTION INTRAMUSCULAR; INTRAVENOUS; SUBCUTANEOUS EVERY 10 MIN PRN
Status: DISCONTINUED | OUTPATIENT
Start: 2019-06-19 | End: 2019-06-19 | Stop reason: HOSPADM

## 2019-06-19 RX ORDER — FENTANYL CITRATE 50 UG/ML
INJECTION, SOLUTION INTRAMUSCULAR; INTRAVENOUS PRN
Status: DISCONTINUED | OUTPATIENT
Start: 2019-06-19 | End: 2019-06-19

## 2019-06-19 RX ORDER — SCOLOPAMINE TRANSDERMAL SYSTEM 1 MG/1
1 PATCH, EXTENDED RELEASE TRANSDERMAL
Status: DISCONTINUED | OUTPATIENT
Start: 2019-06-19 | End: 2019-06-19 | Stop reason: HOSPADM

## 2019-06-19 RX ORDER — FENTANYL CITRATE 50 UG/ML
25-50 INJECTION, SOLUTION INTRAMUSCULAR; INTRAVENOUS
Status: CANCELLED | OUTPATIENT
Start: 2019-06-19

## 2019-06-19 RX ORDER — KETOROLAC TROMETHAMINE 30 MG/ML
30 INJECTION, SOLUTION INTRAMUSCULAR; INTRAVENOUS EVERY 6 HOURS PRN
Status: DISCONTINUED | OUTPATIENT
Start: 2019-06-19 | End: 2019-06-19 | Stop reason: HOSPADM

## 2019-06-19 RX ORDER — PROPOFOL 10 MG/ML
INJECTION, EMULSION INTRAVENOUS PRN
Status: DISCONTINUED | OUTPATIENT
Start: 2019-06-19 | End: 2019-06-19

## 2019-06-19 RX ORDER — LABETALOL 20 MG/4 ML (5 MG/ML) INTRAVENOUS SYRINGE
10
Status: DISCONTINUED | OUTPATIENT
Start: 2019-06-19 | End: 2019-06-19 | Stop reason: HOSPADM

## 2019-06-19 RX ORDER — FUROSEMIDE 10 MG/ML
INJECTION INTRAMUSCULAR; INTRAVENOUS PRN
Status: DISCONTINUED | OUTPATIENT
Start: 2019-06-19 | End: 2019-06-19

## 2019-06-19 RX ORDER — PROPOFOL 10 MG/ML
INJECTION, EMULSION INTRAVENOUS CONTINUOUS PRN
Status: DISCONTINUED | OUTPATIENT
Start: 2019-06-19 | End: 2019-06-19

## 2019-06-19 RX ORDER — DEXAMETHASONE SODIUM PHOSPHATE 4 MG/ML
4 INJECTION, SOLUTION INTRA-ARTICULAR; INTRALESIONAL; INTRAMUSCULAR; INTRAVENOUS; SOFT TISSUE EVERY 10 MIN PRN
Status: DISCONTINUED | OUTPATIENT
Start: 2019-06-19 | End: 2019-06-19 | Stop reason: HOSPADM

## 2019-06-19 RX ORDER — ONDANSETRON 2 MG/ML
4 INJECTION INTRAMUSCULAR; INTRAVENOUS EVERY 30 MIN PRN
Status: DISCONTINUED | OUTPATIENT
Start: 2019-06-19 | End: 2019-06-19 | Stop reason: HOSPADM

## 2019-06-19 RX ORDER — HYDRALAZINE HYDROCHLORIDE 20 MG/ML
2.5-5 INJECTION INTRAMUSCULAR; INTRAVENOUS EVERY 10 MIN PRN
Status: DISCONTINUED | OUTPATIENT
Start: 2019-06-19 | End: 2019-06-19 | Stop reason: HOSPADM

## 2019-06-19 RX ORDER — LIDOCAINE HYDROCHLORIDE 20 MG/ML
INJECTION, SOLUTION INFILTRATION; PERINEURAL PRN
Status: DISCONTINUED | OUTPATIENT
Start: 2019-06-19 | End: 2019-06-19

## 2019-06-19 RX ORDER — SODIUM CHLORIDE, SODIUM LACTATE, POTASSIUM CHLORIDE, CALCIUM CHLORIDE 600; 310; 30; 20 MG/100ML; MG/100ML; MG/100ML; MG/100ML
INJECTION, SOLUTION INTRAVENOUS CONTINUOUS PRN
Status: DISCONTINUED | OUTPATIENT
Start: 2019-06-19 | End: 2019-06-19

## 2019-06-19 RX ORDER — MAGNESIUM HYDROXIDE 1200 MG/15ML
LIQUID ORAL PRN
Status: DISCONTINUED | OUTPATIENT
Start: 2019-06-19 | End: 2019-06-19 | Stop reason: HOSPADM

## 2019-06-19 RX ORDER — DEXAMETHASONE SODIUM PHOSPHATE 4 MG/ML
INJECTION, SOLUTION INTRA-ARTICULAR; INTRALESIONAL; INTRAMUSCULAR; INTRAVENOUS; SOFT TISSUE PRN
Status: DISCONTINUED | OUTPATIENT
Start: 2019-06-19 | End: 2019-06-19

## 2019-06-19 RX ORDER — NALOXONE HYDROCHLORIDE 0.4 MG/ML
.1-.4 INJECTION, SOLUTION INTRAMUSCULAR; INTRAVENOUS; SUBCUTANEOUS
Status: DISCONTINUED | OUTPATIENT
Start: 2019-06-19 | End: 2019-06-19 | Stop reason: HOSPADM

## 2019-06-19 RX ORDER — CEFAZOLIN SODIUM 1 G/3ML
1 INJECTION, POWDER, FOR SOLUTION INTRAMUSCULAR; INTRAVENOUS SEE ADMIN INSTRUCTIONS
Status: DISCONTINUED | OUTPATIENT
Start: 2019-06-19 | End: 2019-06-19 | Stop reason: HOSPADM

## 2019-06-19 RX ORDER — MEPERIDINE HYDROCHLORIDE 25 MG/ML
12.5 INJECTION INTRAMUSCULAR; INTRAVENOUS; SUBCUTANEOUS
Status: DISCONTINUED | OUTPATIENT
Start: 2019-06-19 | End: 2019-06-19 | Stop reason: HOSPADM

## 2019-06-19 RX ORDER — ONDANSETRON 2 MG/ML
INJECTION INTRAMUSCULAR; INTRAVENOUS PRN
Status: DISCONTINUED | OUTPATIENT
Start: 2019-06-19 | End: 2019-06-19

## 2019-06-19 RX ORDER — ONDANSETRON 4 MG/1
4 TABLET, ORALLY DISINTEGRATING ORAL EVERY 30 MIN PRN
Status: DISCONTINUED | OUTPATIENT
Start: 2019-06-19 | End: 2019-06-19 | Stop reason: HOSPADM

## 2019-06-19 RX ORDER — CEFAZOLIN SODIUM 2 G/100ML
2 INJECTION, SOLUTION INTRAVENOUS
Status: COMPLETED | OUTPATIENT
Start: 2019-06-19 | End: 2019-06-19

## 2019-06-19 RX ADMIN — MIDAZOLAM 2 MG: 1 INJECTION INTRAMUSCULAR; INTRAVENOUS at 11:51

## 2019-06-19 RX ADMIN — PROPOFOL 200 MG: 10 INJECTION, EMULSION INTRAVENOUS at 11:56

## 2019-06-19 RX ADMIN — SCOPALAMINE 1 PATCH: 1 PATCH, EXTENDED RELEASE TRANSDERMAL at 11:13

## 2019-06-19 RX ADMIN — PROPOFOL 150 MCG/KG/MIN: 10 INJECTION, EMULSION INTRAVENOUS at 11:53

## 2019-06-19 RX ADMIN — CEFAZOLIN SODIUM 2 G: 2 INJECTION, SOLUTION INTRAVENOUS at 12:02

## 2019-06-19 RX ADMIN — LIDOCAINE HYDROCHLORIDE 100 MG: 20 INJECTION, SOLUTION INFILTRATION; PERINEURAL at 11:55

## 2019-06-19 RX ADMIN — ONDANSETRON 4 MG: 2 INJECTION INTRAMUSCULAR; INTRAVENOUS at 12:01

## 2019-06-19 RX ADMIN — FENTANYL CITRATE 50 MCG: 50 INJECTION, SOLUTION INTRAMUSCULAR; INTRAVENOUS at 12:06

## 2019-06-19 RX ADMIN — SODIUM CHLORIDE, POTASSIUM CHLORIDE, SODIUM LACTATE AND CALCIUM CHLORIDE: 600; 310; 30; 20 INJECTION, SOLUTION INTRAVENOUS at 11:50

## 2019-06-19 RX ADMIN — FENTANYL CITRATE 25 MCG: 50 INJECTION, SOLUTION INTRAMUSCULAR; INTRAVENOUS at 12:01

## 2019-06-19 RX ADMIN — FENTANYL CITRATE 25 MCG: 50 INJECTION, SOLUTION INTRAMUSCULAR; INTRAVENOUS at 11:55

## 2019-06-19 RX ADMIN — SODIUM CHLORIDE, POTASSIUM CHLORIDE, SODIUM LACTATE AND CALCIUM CHLORIDE: 600; 310; 30; 20 INJECTION, SOLUTION INTRAVENOUS at 13:41

## 2019-06-19 RX ADMIN — PHENYLEPHRINE HYDROCHLORIDE 100 MCG: 10 INJECTION INTRAVENOUS at 13:52

## 2019-06-19 RX ADMIN — FUROSEMIDE 20 MG: 10 INJECTION, SOLUTION INTRAVENOUS at 14:42

## 2019-06-19 RX ADMIN — PHENYLEPHRINE HYDROCHLORIDE 100 MCG: 10 INJECTION INTRAVENOUS at 12:03

## 2019-06-19 RX ADMIN — PHENYLEPHRINE HYDROCHLORIDE 100 MCG: 10 INJECTION INTRAVENOUS at 14:06

## 2019-06-19 RX ADMIN — KETOROLAC TROMETHAMINE 30 MG: 30 INJECTION, SOLUTION INTRAMUSCULAR at 14:42

## 2019-06-19 RX ADMIN — DEXAMETHASONE SODIUM PHOSPHATE 4 MG: 4 INJECTION, SOLUTION INTRA-ARTICULAR; INTRALESIONAL; INTRAMUSCULAR; INTRAVENOUS; SOFT TISSUE at 12:02

## 2019-06-19 ASSESSMENT — COPD QUESTIONNAIRES: COPD: 0

## 2019-06-19 ASSESSMENT — MIFFLIN-ST. JEOR: SCORE: 1261.39

## 2019-06-19 ASSESSMENT — LIFESTYLE VARIABLES: TOBACCO_USE: 0

## 2019-06-19 ASSESSMENT — ENCOUNTER SYMPTOMS: SEIZURES: 0

## 2019-06-19 NOTE — ANESTHESIA POSTPROCEDURE EVALUATION
Patient: Mel Reeves    Procedure(s):  Cystoscopy, LEFT retrograde pyelogram, LEFT ureteral stent removal, LEFT ureteroscopy with laser lithotripsy, LEFT ureteral stent placement,  stone basketing with LEFT kidney stone removal    Diagnosis:LEFT KIDNEY STONE  Diagnosis Additional Information: No value filed.    Anesthesia Type:  General, LMA    Note:  Anesthesia Post Evaluation    Patient location during evaluation: PACU  Patient participation: Able to fully participate in evaluation  Level of consciousness: awake and alert  Pain management: adequate  Airway patency: patent  Cardiovascular status: acceptable  Respiratory status: acceptable and unassisted  Hydration status: acceptable  PONV: none             Last vitals:  Vitals:    06/19/19 1056 06/19/19 1446   BP: 132/61 111/72   Pulse:  73   Resp: 16 13   Temp: 36.3  C (97.4  F) 36.2  C (97.2  F)   SpO2: 100% 99%         Electronically Signed By: Evelyn Blackburn MD  June 19, 2019  2:51 PM

## 2019-06-19 NOTE — ANESTHESIA PREPROCEDURE EVALUATION
Anesthesia Pre-Procedure Evaluation    Patient: Mel Reeves   MRN: [id number] : 1970          Preoperative Diagnosis: LEFT KIDNEY STONE    Procedure(s):  CYSTOSCOPY LEFT URETEROSCOPY HOLMIUM LASER LITHOTRIPSY ; STONE BASKETING ; LEFT STENT REMOVAL POSSIBLE EXCHANGE    Past Medical History:   Diagnosis Date     Kidney stones      Migraines      Migraines      Motion sickness      Osteogenesis imperfecta      Past Surgical History:   Procedure Laterality Date     ABDOMEN SURGERY      CS     APPENDECTOMY      with ovarian cyst removal     COMBINED CYSTOSCOPY, RETROGRADES, URETEROSCOPY, LASER HOLMIUM LITHOTRIPSY URETER(S), INSERT STENT Right 2019    Procedure: CYSTOSCOPY, RIGHT RETROGRADES, PYELOGRAM, RIGHT URETEROSCOPY, LASER HOLMIUM LITHOTRIPSY BASKET REMOVAL OF STONES AND RIGHT STENT PLACEMENT;  Surgeon: Warner Ramirez MD;  Location:  OR     CYSTOSCOPY      stone removal     CYSTOSCOPY, URETEROSCOPY, COMBINED Left 2019    Procedure: Cystoscopy, flexible  ureteroscopy, RETROGRADES, URETER STENT PLACENMENT.;  Surgeon: Warner Ramirez MD;  Location:  OR     GYN SURGERY      ovarian cyst removal     IR RENAL STONE REMOVAL LEFT  2019     PERCUTANEOUS NEPHROLITHOTOMY Left 2019    Procedure: LEFT PERCUTANEOUS NEPHROLITHOTOMY, ,;  Surgeon: Warner Ramirez MD;  Location:  OR       Anesthesia Evaluation     . Pt has had prior anesthetic. Type: General    History of anesthetic complications   - PONV        ROS/MED HX    ENT/Pulmonary:      (-) tobacco use, asthma, COPD and sleep apnea   Neurologic: Comment: Motion sickness    (+)migraines,    (-) seizures, CVA, TIA, Dementia and Neuropathy   Cardiovascular:        (-) hypertension and CAD   METS/Exercise Tolerance:     Hematologic:  - neg hematologic  ROS       Musculoskeletal:   (+)  other musculoskeletal- osteogenesis imperfecta      GI/Hepatic:        (-) GERD and liver disease   Renal/Genitourinary:     (+) Nephrolithiasis ,    (-)  "renal disease   Endo:      (-) Type I DM and Type II DM   Psychiatric:         Infectious Disease:         Malignancy:         Other:                          Physical Exam  Normal systems: cardiovascular, pulmonary and dental    Airway   Mallampati: II  TM distance: >3 FB  Neck ROM: full    Dental     Cardiovascular       Pulmonary             Lab Results   Component Value Date    WBC 7.2 05/21/2019    HGB 9.7 (L) 05/21/2019    HCT 28.9 (L) 05/21/2019     05/21/2019     05/21/2019    POTASSIUM 3.8 05/21/2019    CHLORIDE 112 (H) 05/21/2019    CO2 26 05/21/2019    BUN 6 (L) 05/21/2019    CR 0.60 05/21/2019    GLC 91 05/21/2019    MINI 7.8 (L) 05/21/2019    ALBUMIN 4.1 04/02/2019    PROTTOTAL 7.6 04/02/2019    ALT 13 04/02/2019    AST 15 04/02/2019    ALKPHOS 63 04/02/2019    BILITOTAL 0.3 04/02/2019    LIPASE 159 04/02/2019    HCG Negative 05/20/2019    HCGS Negative 04/02/2019       Preop Vitals  BP Readings from Last 3 Encounters:   05/29/19 106/68   05/21/19 101/55   04/15/19 120/70    Pulse Readings from Last 3 Encounters:   05/29/19 94   05/20/19 64   04/15/19 87      Resp Readings from Last 3 Encounters:   05/21/19 16   04/08/19 14   04/02/19 18    SpO2 Readings from Last 3 Encounters:   05/29/19 99%   05/21/19 100%   04/15/19 99%      Temp Readings from Last 1 Encounters:   05/20/19 35.7  C (96.3  F) (Oral)    Ht Readings from Last 1 Encounters:   05/29/19 1.626 m (5' 4\")      Wt Readings from Last 1 Encounters:   05/29/19 63.5 kg (140 lb)    Estimated body mass index is 24.03 kg/m  as calculated from the following:    Height as of 5/29/19: 1.626 m (5' 4\").    Weight as of 5/29/19: 63.5 kg (140 lb).       Anesthesia Plan      History & Physical Review  History and physical reviewed and following examination; no interval change.    ASA Status:  2 .    NPO Status:  > 8 hours    Plan for General and LMA with Intravenous and Propofol induction. Maintenance will be TIVA.    PONV prophylaxis:  " Ondansetron (or other 5HT-3), Dexamethasone or Solumedrol and Scopolamine patch       Postoperative Care  Postoperative pain management:  Multi-modal analgesia.      Consents  Anesthetic plan, risks, benefits and alternatives discussed with:  Patient..                 Evelyn Blackburn MD

## 2019-06-19 NOTE — OP NOTE
OPERATIVE REPORT  DATE OF SURGERY: 06/19/19  LOCATION OF SURGERY: [facility] OR  PREOPERATIVE DIAGNOSIS:  (N20.0) Kidney stone on left side  (primary encounter diagnosis)  POSTOPERATIVE DIAGNOSIS: (N20.0) Kidney stone on left side  (primary encounter diagnosis)  START TIME: 12:06 PM  END TIME: 2:33 PM  PROCEDURE PERFORMED:   1. Cystoscopy and LEFT ureteral stent removal  2. LEFT retrograde pyelogram  3. LEFT ureteroscopy with laser lithotripsy  4. LEFT ureteroscopy with basketing of stones - Modifer 22 for >50 basket stone retrievals  5. LEFT JJ stent placement  6. >1hr physician fluoroscopy time      STAFF SURGEON: Warner Ramirez MD  ANESTHESIA: General.   ESTIMATED BLOOD LOSS: 5 mL.   DRAINS AND TUBES: 6fr x 22cm ureteral stent, 16fr hernandez catheter  COMPLICATIONS: None.   DISPOSITION: PACU.   SPECIMENS OBTAINED:   ID Type Source Tests Collected by Time Destination   1 : LEFT KIDNEY STONES Calculus/Stone Kidney, Left STONE ANALYSIS Warner Ramirez MD 6/19/2019  2:29 PM      SIGNIFICANT FINDINGS: Cystoscopy with no evidence of stones in the bladder. Previously placed ureteral stent removed intact. Retrograde Pyelogram with no evidence of stones in the ureter. Ureteroscopy with evidence of large volume of stone burden in the lower pole - partial staghorn. Stone laser dusted and fragmented. Basketing with removal of all stones >1-2mm. Ureteral stent placed.      HISTORY OF PRESENT ILLNESS: Mrs. Reeves is a 49 year old with history of LEFT staghorn stone s/p LEFT PCNL on 5/20/19 with evidence of residual large stone in the lower pole which was unable to be accessed during the PCNL. She presents for stone removal.     OPERATION PERFORMED:   Informed consent was obtained and the patient was brought to the operating room where general anesthesia was induced. The patient was given appropriate preoperative antibiotics and positioned supine. The patient was then repositioned in dorsal lithotomy with all pressure points  padded. We then performed a timeout, verifying the correct patient's site and procedure to be performed.    A 22fr cystoscope was inserted atraumatically into the bladder. Complete cystoscopy was performed with no evidence of a stone within the bladder. The previously placed LEFT ureteral stent was grasped and withdrawn to the meatus. This was cannulated with a 0.035 Sensor wire which was advanced up the LEFT kidney under fluoroscopic guidance. The stent was removed. A 10fr dual lumen catheter was advanced over the wire and a gentle Retrograde Pyelogram was completed with no evidence of stone in the ureter. The super-stiff wire was advanced up to the renal pelvis and the scope was removed. A 13/15 36cm ureteral access sheath was advanced over the super-stiff wire under fluoroscopic guidance to the level of the UPJ and the wire and the inner sheath were removed. The flexible ureteroscope was assembled and advanced up the renal pelvis and complete pyeloscopy was performed. The large partial staghorn stone was identified in the lower pole. The stone was fragmented with the laser fiber, this was considerably difficult due to the stone hardness and partial staghorn nature. All fragments >1-2mm were basketed and removed. This was quite difficult due to the significant stone burden with >50 stone basket removals. The scope and the sheath were removed en bloc with complete visualization of the ureter. There was no evidence of ureteral injury however the UPJ was edematous. A 6fr x 22cm JJ stent was advanced over the Sensor wire with good curl noted in the renal pelvis and in the bladder fluoroscopically. A 16fr hernandez was placed.   The patient was emerged from anesthesia and recovered in the PACU.      Warner Ramirez MD   Urology  [facility]  Clinic Phone [phone]

## 2019-06-19 NOTE — ANESTHESIA CARE TRANSFER NOTE
Patient: Mel Reeves    Procedure(s):  Cystoscopy, LEFT retrograde pyelogram, LEFT ureteral stent removal, LEFT ureteroscopy with laser lithotripsy, LEFT ureteral stent placement,  stone basketing with LEFT kidney stone removal    Diagnosis: LEFT KIDNEY STONE  Diagnosis Additional Information: No value filed.    Anesthesia Type:   General, LMA     Note:  Airway :Face Mask  Patient transferred to:PACU  Comments: At end of procedure, spontaneous respirations, adequate tidal volumes, followed commands to voice, LMA removed atraumatically, oropharynx suctioned, airway patent after LMA removal. Oxygen via facemask at 10 liters per minute to PACU. Oxygen tubing connected to wall O2 in PACU, SpO2, NiBP, and EKG monitors and alarms on and functioning, Violet Hugger warmer connected to patient gown, report on patient's clinical status given to PACU RN, RN questions answered.Handoff Report: Identifed the Patient, Identified the Reponsible Provider, Reviewed the pertinent medical history, Discussed the surgical course, Reviewed Intra-OP anesthesia mangement and issues during anesthesia, Set expectations for post-procedure period and Allowed opportunity for questions and acknowledgement of understanding      Vitals: (Last set prior to Anesthesia Care Transfer)    CRNA VITALS  6/19/2019 1414 - 6/19/2019 1449      6/19/2019             Resp Rate (observed):  14    EKG:  Sinus rhythm                Electronically Signed By: ARCELIA Iraheta CRNA  June 19, 2019  2:49 PM

## 2019-06-19 NOTE — DISCHARGE INSTRUCTIONS
POSTOPERATIVE INSTRUCTIONS    Diagnosis-------------------------------   LEFT kidney stone    Procedure-------------------------------  Procedure(s) (LRB):  Cystoscopy, LEFT retrograde pyelogram, LEFT ureteral stent removal, LEFT ureteroscopy with laser lithotripsy, LEFT ureteral stent placement,  stone basketing with LEFT kidney stone removal (Left)      Findings--------------------------------  Cystoscopy with no evidence of stones in the bladder. Previously placed ureteral stent removed intact. Retrograde Pyelogram with no evidence of stones in the ureter. Ureteroscopy with evidence of large volume of stone burden in the lower pole - partial staghorn. Stone laser dusted and fragmented. Basketing with removal of all stones >1-2mm. Ureteral stent placed.     Home-going instructions-----------------         Activity Limitation:     - No driving or operating heavy machinery while on narcotic pain medication.     FOLLOW THESE INSTRUCTIONS AS INDICATED BELOW:  - Observe operative area for signs of excessive bleeding.  - You may shower.  - Increase fluid intake to promote clear urine.  - Resume usual diet as tolerated    What to expect while recovering-----------  - You may experience some intermittent bleeding that makes your urine pink or cherry colored. This is normal.  - However, if you are unable to urinate, passing large amount of clots, have gayle blood in your urine, or have a temperature >101 degrees, call the urology nurse on call, or present to your nearest emergency department.  - You are encouraged to walk daily, and have no activity restrictions.   - A URETERAL STENT has been placed that allows urine to flow unobstructed from your kidney into your bladder.  The stent has a curl in the kidney and a curl in the bladder.  The curl in the bladder can cause some urgency and frequency of urination as well as some mild blood in the urine.  The curl in the kidney can cause some mild flank discomfort.  This may be  more noticeable when you urinate.  A URETERAL STENT is meant to be left in temporarily.  It must be removed or changed no later than 3 months after it's insertion.  If it's not removed it can result in stone overgrowth on the stent that can cause pain, infection, and can be very difficult to remove.      Discharge Medications/instructions:     - Flomax (tamsulosin) to be taken daily until stent is removed    - Ditropan to be taken daily until stent is removed    - Take Tylenol 1000mg every 6 hours for pain    - Take Ibuprofen 600mg every 6 hours as needed for additional pain control    - Take Oxycodone 5mg every 4-6 hours only for break through pain    - Take Colace while taking Oxycodone to prevent constipation      Questions/concerns------------------------  [facility]: [phone]  [facility]: [phone]    Future appointments  You will be contacted to schedule ureteral stent removal for next week with Dr. Ashley Ramirez MD    Information for Patients Discharging with a Transderm Scopolamine Patch       Dry mouth is a common side effect.    Drowsiness is another common side effect especially when combined with pain medication.  Please avoid activities that require mental alertness such as driving a car or making important legal decisions.    Since Scopolamine can cause temporary dilation of the pupils and blurred vision if it comes in contact with the eyes; be sure to wash your hands thoroughly with soap and water immediately after handling the patch.   When you remove your patch, please stick it to a tissue or paper towel for disposal.      Remove the patch immediately and contact a physician in the unlikely event that you experience symptoms of acute glaucoma (pain and reddening of the eyes, accompanied by dilated pupils).    Remove the patch if you develop any difficulties urinating.  If you cannot urinate after removing your patch, please notify your  surgeon.    Remove the patch 24 hours after surgery.    Today you received Toradol, an antiinflammatory medication similar to Ibuprofen.  You should not take other antiinflammatory medication, such as Ibuprofen, Motrin, Advil, Aleve, Naprosyn, etc until 8:45 PM.     Same Day Surgery Discharge Instructions for  Sedation and General Anesthesia       It's not unusual to feel dizzy, light-headed or faint for up to 24 hours after surgery or while taking pain medication.  If you have these symptoms: sit for a few minutes before standing and have someone assist you when you get up to walk or use the bathroom.      You should rest and relax for the next 24 hours. We recommend you make arrangements to have an adult stay with you for at least 24 hours after your discharge.  Avoid hazardous and strenuous activity.      DO NOT DRIVE any vehicle or operate mechanical equipment for 24 hours following the end of your surgery.  Even though you may feel normal, your reactions may be affected by the medication you have received.      Do not drink alcoholic beverages for 24 hours following surgery.       Slowly progress to your regular diet as you feel able. It's not unusual to feel nauseated and/or vomit after receiving anesthesia.  If you develop these symptoms, drink clear liquids (apple juice, ginger ale, broth, 7-up, etc. ) until you feel better.  If your nausea and vomiting persists for 24 hours, please notify your surgeon.        All narcotic pain medications, along with inactivity and anesthesia, can cause constipation. Drinking plenty of liquids and increasing fiber intake will help.      For any questions of a medical nature, call your surgeon.      Do not make important decisions for 24 hours.      If you had general anesthesia, you may have a sore throat for a couple of days related to the breathing tube used during surgery.  You may use Cepacol lozenges to help with this discomfort.  If it worsens or if you develop a  fever, contact your surgeon.       If you feel your pain is not well managed with the pain medications prescribed by your surgeon, please contact your surgeon's office to let them know so they can address your concerns.     **If you have questions or concerns about your procedure,   call Dr. Ramirez at [phone]**

## 2019-06-20 LAB — COPATH REPORT: NORMAL

## 2019-06-23 LAB
APPEARANCE STONE: NORMAL
COMPN STONE: NORMAL
NUMBER STONE: NORMAL
SIZE STONE: NORMAL MM
WT STONE: 683 MG

## 2019-06-26 ENCOUNTER — OFFICE VISIT (OUTPATIENT)
Dept: UROLOGY | Facility: CLINIC | Age: 49
End: 2019-06-26
Payer: COMMERCIAL

## 2019-06-26 VITALS
DIASTOLIC BLOOD PRESSURE: 70 MMHG | WEIGHT: 141 LBS | SYSTOLIC BLOOD PRESSURE: 124 MMHG | BODY MASS INDEX: 24.07 KG/M2 | HEIGHT: 64 IN | HEART RATE: 80 BPM

## 2019-06-26 DIAGNOSIS — Z46.6 ENCOUNTER FOR REMOVAL OF URETERAL STENT: ICD-10-CM

## 2019-06-26 DIAGNOSIS — N20.0 CALCULUS OF KIDNEY: Primary | ICD-10-CM

## 2019-06-26 PROCEDURE — 52310 CYSTOSCOPY AND TREATMENT: CPT | Mod: 58 | Performed by: UROLOGY

## 2019-06-26 ASSESSMENT — PAIN SCALES - GENERAL: PAINLEVEL: NO PAIN (0)

## 2019-06-26 ASSESSMENT — MIFFLIN-ST. JEOR: SCORE: 1259.57

## 2019-06-26 NOTE — PROGRESS NOTES
CYSTOSCOPY  AND URETERAL STENT REMOVAL PROCEDURE NOTE:    Mel Reeves is a 49 year old female who presents with ureteral stent for a cystoscopy and ureteral stent removal.    Pt ID verified with patient: Yes     Procedure verified with patient: Yes     Procedure confirmed with physician and support staff: Yes     Consent confirmed with physician and support staff.    Sign In:  History and Physical Exam reviewed   Primary Diagnosis: LEFT ureteral stent   Informed Consent Discussed: Yes   Sign in Communication: Yes   Time Out:  Team Confirms the Correct Patient, Correct Procedure; Yes , Correct Site and Site Marking, Correct Position (if applicable).  Affirmation of Time Out: Yes   Sign Out:  Sign Out Discussion: Yes     Mel Reeves is a 49 year old female with an indwelling ureteral stent in need of removal.    CYSTOSCOPY PROCEDURE:  After sterile preparation and draping of the patient,  a 17-Wallisian flexible cystoscope was introduced via the urethra.  It was passed without difficulty into the bladder.  The urethra was open without evidence of stricture.  The ureteral orifices were orthotopic.  The double J stent was seen coming out the left side.  It was grasped with an alligator forceps and extracted intact without difficulty.  The patient tolerated the procedure well    A/P Successful stent removal  Prophylactic antibiotic ordered   Stone prevention counseling provided today  - We discussed that given her history of bilateral stones with a large staghorn stone we discussed the need for follow up with Nephrology in 2-3 months following completion of two 24-hour urinalysis and consult order placed     Stone analysis:  Brown calculi fragments composed primarily of:   80% calcium oxalate monohydrate, and   20% calcium phosphate (hydroxy- and carbonate- apatite).   Tan calculi fragments composed primarily of:   50% calcium oxalate monohydrate,   10% calcium oxalate dihydrate, and   40% calcium phosphate (hydroxy-  and carbonate- apatite).     Watch for any new onset fevers, signs of UTI.  May expect some pain after removal.  If this is severe, or last many hours, you may need to return for replacement of stent.    Warner Ramirez MD   Urology  [facility]

## 2019-06-26 NOTE — LETTER
6/26/2019       RE: Mel Reeves  [address] [address]     Dear Colleague,    Thank you for referring your patient, Mel Reeves, to the [facility] [place] at [facility]. Please see a copy of my visit note below.    CYSTOSCOPY  AND URETERAL STENT REMOVAL PROCEDURE NOTE:    Mel Reeves is a 49 year old female who presents with ureteral stent for a cystoscopy and ureteral stent removal.    Pt ID verified with patient: Yes     Procedure verified with patient: Yes     Procedure confirmed with physician and support staff: Yes     Consent confirmed with physician and support staff.    Sign In:  History and Physical Exam reviewed   Primary Diagnosis: LEFT ureteral stent   Informed Consent Discussed: Yes   Sign in Communication: Yes   Time Out:  Team Confirms the Correct Patient, Correct Procedure; Yes , Correct Site and Site Marking, Correct Position (if applicable).  Affirmation of Time Out: Yes   Sign Out:  Sign Out Discussion: Yes     Mel Reeves is a 49 year old female with an indwelling ureteral stent in need of removal.    CYSTOSCOPY PROCEDURE:  After sterile preparation and draping of the patient,  a 17-Vietnamese flexible cystoscope was introduced via the urethra.  It was passed without difficulty into the bladder.  The urethra was open without evidence of stricture.  The ureteral orifices were orthotopic.  The double J stent was seen coming out the left side.  It was grasped with an alligator forceps and extracted intact without difficulty.  The patient tolerated the procedure well    A/P Successful stent removal  Prophylactic antibiotic ordered   Stone prevention counseling provided today  - We discussed that given her history of bilateral stones with a large staghorn stone we discussed the need for follow up with Nephrology in 2-3 months following completion of two 24-hour urinalysis and consult order placed     Stone  analysis:  Brown calculi fragments composed primarily of:   80% calcium oxalate monohydrate, and   20% calcium phosphate (hydroxy- and carbonate- apatite).   Tan calculi fragments composed primarily of:   50% calcium oxalate monohydrate,   10% calcium oxalate dihydrate, and   40% calcium phosphate (hydroxy- and carbonate- apatite).     Watch for any new onset fevers, signs of UTI.  May expect some pain after removal.  If this is severe, or last many hours, you may need to return for replacement of stent.    Warner Ramirez MD   Urology  [facility]     Again, thank you for allowing me to participate in the care of your patient.      Sincerely,    Warner Ramirez MD

## 2019-07-28 DIAGNOSIS — N20.0 CALCULUS OF LEFT KIDNEY: ICD-10-CM

## 2019-07-29 RX ORDER — OXYBUTYNIN CHLORIDE 5 MG/1
TABLET, EXTENDED RELEASE ORAL
Qty: 1 TABLET | Refills: 0 | OUTPATIENT
Start: 2019-07-29

## 2019-08-11 ENCOUNTER — TRANSFERRED RECORDS (OUTPATIENT)
Dept: HEALTH INFORMATION MANAGEMENT | Facility: CLINIC | Age: 49
End: 2019-08-11

## 2019-09-03 ENCOUNTER — TRANSFERRED RECORDS (OUTPATIENT)
Dept: HEALTH INFORMATION MANAGEMENT | Facility: CLINIC | Age: 49
End: 2019-09-03

## 2021-10-05 ENCOUNTER — HOSPITAL ENCOUNTER (OUTPATIENT)
Dept: GENERAL RADIOLOGY | Facility: CLINIC | Age: 51
Discharge: HOME | End: 2021-10-05
Attending: INTERNAL MEDICINE | Admitting: INTERNAL MEDICINE
Payer: COMMERCIAL

## 2021-10-05 DIAGNOSIS — N20.0 NEPHROLITHIASIS: ICD-10-CM

## 2021-10-05 PROCEDURE — 74019 RADEX ABDOMEN 2 VIEWS: CPT

## 2021-10-26 ENCOUNTER — VIRTUAL VISIT (OUTPATIENT)
Dept: UROLOGY | Facility: CLINIC | Age: 51
End: 2021-10-26
Payer: COMMERCIAL

## 2021-10-26 VITALS — BODY MASS INDEX: 22.2 KG/M2 | WEIGHT: 130 LBS | HEIGHT: 64 IN

## 2021-10-26 DIAGNOSIS — N20.0 LEFT NEPHROLITHIASIS: Primary | ICD-10-CM

## 2021-10-26 PROCEDURE — 99214 OFFICE O/P EST MOD 30 MIN: CPT | Mod: GT | Performed by: UROLOGY

## 2021-10-26 ASSESSMENT — MIFFLIN-ST. JEOR: SCORE: 1199.68

## 2021-10-26 ASSESSMENT — PAIN SCALES - GENERAL: PAINLEVEL: NO PAIN (0)

## 2021-10-26 NOTE — LETTER
10/26/2021       RE: Mel Reeves  [address]  [place] [address]     Dear Colleague,    Thank you for referring your patient, Mel Reeves, to the [facility] [place] at [facility]. Please see a copy of my visit note below.    *SEND LINK TO CELL PHONE*    Mel is a 51 year old who is being evaluated via a billable video visit.      How would you like to obtain your AVS? MyChart  If the video visit is dropped, the invitation should be resent by: Text to cell phone: [phone]  Will anyone else be joining your video visit? No      Video Start Time: 454  Video-Visit Details    Type of service:  Video Visit    Video End Time:506    Originating Location (pt. Location): Home    Distant Location (provider location):  [facility] [place]     Platform used for Video Visit: ReactX     Name: Mel Reeves   MRN: [id number]  YOB: 1970    Assessment and Plan:  51 year old female with hx of bilateral nephrolithiasis and evidence of large volume of left nephrolithiasis    1. Left nephrolithiasis  Ms. Reeves is very encouraged to get to bottom of cause of her stone disease.  We will send her a litholink sent to her to complete.  Also will do repeat BMP, phosphorus and PTH (due to calcium 10.2 drawn recently).    Given KUB findings, will get a CT abd/pelvis without contrast to assess burden within and outside of the collecting system.    Plan:  -Patient will contact us after she sends in litholink for return visit  -Will contact patient to schedule CT and labs    - Parathyroid Hormone Intact; Future  - Basic metabolic panel [LAB15]  - Phosphorus; Future  - CT Abdomen pelvis w/o contrast; Future      Orders Placed This Encounter   Procedures     CT Abdomen pelvis w/o contrast     Parathyroid Hormone Intact     Basic metabolic panel [LAB15]     Phosphorus     Total 30 min spent on day of encounter with visit, documentation,  orders.         Chief Complaint: left nephrolithiasis    History of Present Illness:  Mel Reeves is a 51 year old female seen in follow-up for discussion of left nephrolithiasis.    She is s/p a left PCNL and secondary left URS, as well as right URS by Dr. Ramirez in 2019.  Stone analysis was mixed COM/CAP.      She was recently seen by a nephrologist for metabolic stone evaluation and had a KUB on 10/5 which demonstrated a large burden of stone in the left kidney (midpole and lower pole).  I personally reviewed this XR.    She is currently asymptomatic, no stone passage since the last procedure and no gross hematuria.    I reviewed external records which in summarized above.    I reviewed external labs, of which pertinent ones include: Cr 0.62, Ca 10.2, urine pH 7.0    No baseline stone risk factors for disease           Past Medical History:  Past Medical History:   Diagnosis Date     Kidney stones      Migraines      Migraines      Motion sickness      Osteogenesis imperfecta      PONV (postoperative nausea and vomiting)             Past Surgical History:  Past Surgical History:   Procedure Laterality Date     ABDOMEN SURGERY      CS     APPENDECTOMY  '92    with ovarian cyst removal     COMBINED CYSTOSCOPY, RETROGRADES, URETEROSCOPY, LASER HOLMIUM LITHOTRIPSY URETER(S), INSERT STENT Right 4/8/2019    Procedure: CYSTOSCOPY, RIGHT RETROGRADES, PYELOGRAM, RIGHT URETEROSCOPY, LASER HOLMIUM LITHOTRIPSY BASKET REMOVAL OF STONES AND RIGHT STENT PLACEMENT;  Surgeon: Warner Ramirez MD;  Location:  OR     CYSTOSCOPY      stone removal     CYSTOSCOPY, URETEROSCOPY, COMBINED Left 5/20/2019    Procedure: Cystoscopy, flexible  ureteroscopy, RETROGRADES, URETER STENT PLACENMENT.;  Surgeon: Warner Ramirez MD;  Location:  OR     GYN SURGERY  '92    ovarian cyst removal     IR RENAL STONE REMOVAL LEFT  5/20/2019     LASER HOLMIUM LITHOTRIPSY URETER(S), INSERT STENT, COMBINED Left 6/19/2019    Procedure: Cystoscopy, LEFT  retrograde pyelogram, LEFT ureteral stent removal, LEFT ureteroscopy with laser lithotripsy, LEFT ureteral stent placement,  stone basketing with LEFT kidney stone removal;  Surgeon: Warner Ramirez MD;  Location:  OR     PERCUTANEOUS NEPHROLITHOTOMY Left 5/20/2019    Procedure: LEFT PERCUTANEOUS NEPHROLITHOTOMY, ,;  Surgeon: Warner Ramirez MD;  Location:  OR            Social History:  Social History     Tobacco Use     Smoking status: Never Smoker     Smokeless tobacco: Never Used   Substance Use Topics     Alcohol use: Yes     Comment: minimally     Drug use: No            Family History:  No family history on file.         Allergies:  Allergies   Allergen Reactions     Bactrim [Sulfamethoxazole W/Trimethoprim] Hives     Sulfamethoxazole-Trimethoprim Hives     Sulfa Drugs Hives     Zithromax [Azithromycin] GI Disturbance            Medications:  Current Outpatient Medications   Medication Sig     SUMAtriptan (IMITREX) 50 MG tablet Take 50 mg by mouth at onset of headache for migraine     calcium carbonate 500 mg, elemental, (OSCAL 500) 1250 (500 Ca) MG TABS tablet Take 1 tablet by mouth 2 times daily (Patient not taking: Reported on 10/26/2021)     Cholecalciferol (VITAMIN D PO) Take 1 tablet by mouth daily (Patient not taking: Reported on 10/26/2021)     guaiFENesin (MUCINEX PO) Take 2 capsules by mouth daily as needed (Patient not taking: Reported on 10/26/2021)     oxybutynin ER (DITROPAN-XL) 5 MG 24 hr tablet Take 1 tablet (5 mg) by mouth daily Take daily until the day prior to hernandez removal. (Patient not taking: Reported on 10/26/2021)     tamsulosin (FLOMAX) 0.4 MG capsule TAKE 1 CAPSULE BY MOUTH EVERY DAY (Patient not taking: Reported on 10/26/2021)     No current facility-administered medications for this visit.       Review of Systems:   ROS: 10 point ROS neg other than the symptoms noted above in the HPI.    Physical Exam:  B/P: Data Unavailable, T: Data Unavailable, P: Data Unavailable, R: Data  "Unavailable  Estimated body mass index is 22.31 kg/m  as calculated from the following:    Height as of this encounter: 1.626 m (5' 4\").    Weight as of this encounter: 59 kg (130 lb).  General Appearance Adult: Alert, no acute distress, oriented  Lungs: no respiratory distress, or pursed lip breathing  Neuro: Alert, oriented, speech and mentation normal  Psych: affect and mood normal       Labs:     Creatinine   Date Value Ref Range Status   05/21/2019 0.60 0.52 - 1.04 mg/dL Final     No results found for: UA  No components found for: UC      Outside records:   I spent 5 minutes reviewing outside records.      Tony Gonzalez MD  October 26, 2021      "

## 2021-10-26 NOTE — PROGRESS NOTES
*SEND LINK TO CELL PHONE*    Mel is a 51 year old who is being evaluated via a billable video visit.      How would you like to obtain your AVS? [employer]  If the video visit is dropped, the invitation should be resent by: Text to cell phone: [phone]  Will anyone else be joining your video visit? No      Video Start Time: 454  Video-Visit Details    Type of service:  Video Visit    Video End Time:506    Originating Location (pt. Location): Home    Distant Location (provider location):  [facility] [place]     Platform used for Video Visit: [employer]     Name: Mel Reeves   MRN: [id number]  YOB: 1970    Assessment and Plan:  51 year old female with hx of bilateral nephrolithiasis and evidence of large volume of left nephrolithiasis    1. Left nephrolithiasis  Ms. Reeves is very encouraged to get to bottom of cause of her stone disease.  We will send her a litholink sent to her to complete.  Also will do repeat BMP, phosphorus and PTH (due to calcium 10.2 drawn recently).    Given KUB findings, will get a CT abd/pelvis without contrast to assess burden within and outside of the collecting system.    Plan:  -Patient will contact us after she sends in litholink for return visit  -Will contact patient to schedule CT and labs    - Parathyroid Hormone Intact; Future  - Basic metabolic panel [LAB15]  - Phosphorus; Future  - CT Abdomen pelvis w/o contrast; Future      Orders Placed This Encounter   Procedures     CT Abdomen pelvis w/o contrast     Parathyroid Hormone Intact     Basic metabolic panel [LAB15]     Phosphorus     Total 30 min spent on day of encounter with visit, documentation, orders.         Chief Complaint: left nephrolithiasis    History of Present Illness:  Mel Reeves is a 51 year old female seen in follow-up for discussion of left nephrolithiasis.    She is s/p a left PCNL and secondary left URS, as well as right URS by Dr. Ramirez in 2019.  Stone analysis was mixed  COM/CAP.      She was recently seen by a nephrologist for metabolic stone evaluation and had a KUB on 10/5 which demonstrated a large burden of stone in the left kidney (midpole and lower pole).  I personally reviewed this XR.    She is currently asymptomatic, no stone passage since the last procedure and no gross hematuria.    I reviewed external records which in summarized above.    I reviewed external labs, of which pertinent ones include: Cr 0.62, Ca 10.2, urine pH 7.0    No baseline stone risk factors for disease           Past Medical History:  Past Medical History:   Diagnosis Date     Kidney stones      Migraines      Migraines      Motion sickness      Osteogenesis imperfecta      PONV (postoperative nausea and vomiting)             Past Surgical History:  Past Surgical History:   Procedure Laterality Date     ABDOMEN SURGERY      CS     APPENDECTOMY  '92    with ovarian cyst removal     COMBINED CYSTOSCOPY, RETROGRADES, URETEROSCOPY, LASER HOLMIUM LITHOTRIPSY URETER(S), INSERT STENT Right 4/8/2019    Procedure: CYSTOSCOPY, RIGHT RETROGRADES, PYELOGRAM, RIGHT URETEROSCOPY, LASER HOLMIUM LITHOTRIPSY BASKET REMOVAL OF STONES AND RIGHT STENT PLACEMENT;  Surgeon: Warner Ramirez MD;  Location:  OR     CYSTOSCOPY      stone removal     CYSTOSCOPY, URETEROSCOPY, COMBINED Left 5/20/2019    Procedure: Cystoscopy, flexible  ureteroscopy, RETROGRADES, URETER STENT PLACENMENT.;  Surgeon: Warner Ramirez MD;  Location:  OR     GYN SURGERY  '92    ovarian cyst removal     IR RENAL STONE REMOVAL LEFT  5/20/2019     LASER HOLMIUM LITHOTRIPSY URETER(S), INSERT STENT, COMBINED Left 6/19/2019    Procedure: Cystoscopy, LEFT retrograde pyelogram, LEFT ureteral stent removal, LEFT ureteroscopy with laser lithotripsy, LEFT ureteral stent placement,  stone basketing with LEFT kidney stone removal;  Surgeon: Warner Ramirez MD;  Location:  OR     PERCUTANEOUS NEPHROLITHOTOMY Left 5/20/2019    Procedure: LEFT PERCUTANEOUS  "NEPHROLITHOTOMY, ,;  Surgeon: Warner Ramirez MD;  Location:  OR            Social History:  Social History     Tobacco Use     Smoking status: Never Smoker     Smokeless tobacco: Never Used   Substance Use Topics     Alcohol use: Yes     Comment: minimally     Drug use: No            Family History:  No family history on file.         Allergies:  Allergies   Allergen Reactions     Bactrim [Sulfamethoxazole W/Trimethoprim] Hives     Sulfamethoxazole-Trimethoprim Hives     Sulfa Drugs Hives     Zithromax [Azithromycin] GI Disturbance            Medications:  Current Outpatient Medications   Medication Sig     SUMAtriptan (IMITREX) 50 MG tablet Take 50 mg by mouth at onset of headache for migraine     calcium carbonate 500 mg, elemental, (OSCAL 500) 1250 (500 Ca) MG TABS tablet Take 1 tablet by mouth 2 times daily (Patient not taking: Reported on 10/26/2021)     Cholecalciferol (VITAMIN D PO) Take 1 tablet by mouth daily (Patient not taking: Reported on 10/26/2021)     guaiFENesin (MUCINEX PO) Take 2 capsules by mouth daily as needed (Patient not taking: Reported on 10/26/2021)     oxybutynin ER (DITROPAN-XL) 5 MG 24 hr tablet Take 1 tablet (5 mg) by mouth daily Take daily until the day prior to hernandez removal. (Patient not taking: Reported on 10/26/2021)     tamsulosin (FLOMAX) 0.4 MG capsule TAKE 1 CAPSULE BY MOUTH EVERY DAY (Patient not taking: Reported on 10/26/2021)     No current facility-administered medications for this visit.       Review of Systems:   ROS: 10 point ROS neg other than the symptoms noted above in the HPI.    Physical Exam:  B/P: Data Unavailable, T: Data Unavailable, P: Data Unavailable, R: Data Unavailable  Estimated body mass index is 22.31 kg/m  as calculated from the following:    Height as of this encounter: 1.626 m (5' 4\").    Weight as of this encounter: 59 kg (130 lb).  General Appearance Adult: Alert, no acute distress, oriented  Lungs: no respiratory distress, or pursed lip " breathing  Neuro: Alert, oriented, speech and mentation normal  Psych: affect and mood normal       Labs:     Creatinine   Date Value Ref Range Status   05/21/2019 0.60 0.52 - 1.04 mg/dL Final     No results found for: UA  No components found for: UC      Outside records:   I spent 5 minutes reviewing outside records.      Tony Gonzalez MD  October 26, 2021

## 2021-10-27 ENCOUNTER — TELEPHONE (OUTPATIENT)
Dept: UROLOGY | Facility: CLINIC | Age: 51
End: 2021-10-27

## 2021-10-27 NOTE — TELEPHONE ENCOUNTER
----- Message from Priscilla Hirsch sent at 10/27/2021  8:24 AM CDT -----  Needs non-con CT and labs scheduled, [facility] sent.     Instructed patient to call after sends in [facility] to schedule virtual follow-up appointment    ADILIA

## 2021-11-17 ENCOUNTER — HOSPITAL ENCOUNTER (OUTPATIENT)
Dept: CT IMAGING | Facility: CLINIC | Age: 51
End: 2021-11-17
Attending: UROLOGY
Payer: COMMERCIAL

## 2021-11-17 ENCOUNTER — LAB (OUTPATIENT)
Dept: LAB | Facility: CLINIC | Age: 51
End: 2021-11-17
Attending: UROLOGY
Payer: COMMERCIAL

## 2021-11-17 DIAGNOSIS — N20.0 LEFT NEPHROLITHIASIS: ICD-10-CM

## 2021-11-17 LAB
ANION GAP SERPL CALCULATED.3IONS-SCNC: 5 MMOL/L (ref 3–14)
BUN SERPL-MCNC: 8 MG/DL (ref 7–30)
CALCIUM SERPL-MCNC: 9.4 MG/DL (ref 8.5–10.1)
CHLORIDE BLD-SCNC: 105 MMOL/L (ref 94–109)
CO2 SERPL-SCNC: 29 MMOL/L (ref 20–32)
CREAT SERPL-MCNC: 0.63 MG/DL (ref 0.52–1.04)
GFR SERPL CREATININE-BSD FRML MDRD: >90 ML/MIN/1.73M2
GLUCOSE BLD-MCNC: 118 MG/DL (ref 70–99)
PHOSPHATE SERPL-MCNC: 3.4 MG/DL (ref 2.5–4.5)
POTASSIUM BLD-SCNC: 3.8 MMOL/L (ref 3.4–5.3)
PTH-INTACT SERPL-MCNC: 25 PG/ML
SODIUM SERPL-SCNC: 139 MMOL/L (ref 133–144)

## 2021-11-17 PROCEDURE — 84100 ASSAY OF PHOSPHORUS: CPT

## 2021-11-17 PROCEDURE — 80048 BASIC METABOLIC PNL TOTAL CA: CPT | Performed by: UROLOGY

## 2021-11-17 PROCEDURE — 36415 COLL VENOUS BLD VENIPUNCTURE: CPT | Performed by: UROLOGY

## 2021-11-17 PROCEDURE — 83970 ASSAY OF PARATHORMONE: CPT

## 2021-11-17 PROCEDURE — 74176 CT ABD & PELVIS W/O CONTRAST: CPT

## 2021-12-06 ENCOUNTER — TRANSFERRED RECORDS (OUTPATIENT)
Dept: HEALTH INFORMATION MANAGEMENT | Facility: CLINIC | Age: 51
End: 2021-12-06
Payer: COMMERCIAL

## 2021-12-18 ENCOUNTER — HEALTH MAINTENANCE LETTER (OUTPATIENT)
Age: 51
End: 2021-12-18

## 2022-01-03 ENCOUNTER — VIRTUAL VISIT (OUTPATIENT)
Dept: UROLOGY | Facility: CLINIC | Age: 52
End: 2022-01-03
Payer: COMMERCIAL

## 2022-01-03 VITALS — HEIGHT: 65 IN | WEIGHT: 127 LBS | BODY MASS INDEX: 21.16 KG/M2

## 2022-01-03 DIAGNOSIS — R82.994 HYPERCALCIURIA: ICD-10-CM

## 2022-01-03 DIAGNOSIS — Q78.0 OSTEOGENESIS IMPERFECTA: ICD-10-CM

## 2022-01-03 DIAGNOSIS — N20.0 KIDNEY STONE ON LEFT SIDE: Primary | ICD-10-CM

## 2022-01-03 PROCEDURE — 99214 OFFICE O/P EST MOD 30 MIN: CPT | Mod: GT | Performed by: UROLOGY

## 2022-01-03 RX ORDER — POTASSIUM CHLORIDE 1500 MG/1
20 TABLET, EXTENDED RELEASE ORAL DAILY
Qty: 90 TABLET | Refills: 1 | Status: SHIPPED | OUTPATIENT
Start: 2022-01-03 | End: 2023-08-09

## 2022-01-03 RX ORDER — CHLORTHALIDONE 25 MG/1
25 TABLET ORAL DAILY
Qty: 90 TABLET | Refills: 1 | Status: SHIPPED | OUTPATIENT
Start: 2022-01-03 | End: 2023-08-09

## 2022-01-03 ASSESSMENT — MIFFLIN-ST. JEOR: SCORE: 1201.95

## 2022-01-03 ASSESSMENT — PAIN SCALES - GENERAL: PAINLEVEL: NO PAIN (0)

## 2022-01-03 NOTE — PROGRESS NOTES
Mel is a 51 year old who is being evaluated via a billable video visit.      How would you like to obtain your AVS? MyChart  If the video visit is dropped, the invitation should be resent by: Text to cell phone: [phone]  Will anyone else be joining your video visit? No      Video Start Time: 2:20 pM  Video-Visit Details    Type of service:  Video Visit    Video End Time:2:43 PM    Originating Location (pt. Location): Home    Distant Location (provider location):  [facility] [place]     Platform used for Video Visit: BetterPet     Name: Mel Reeves   MRN: [id number]  YOB: 1970    Assessment and Plan:  51 year old female with left nephrolithiasis and hypercalciuria.    1. Kidney stone on left side    - chlorthalidone (HYGROTON) 25 MG tablet; Take 1 tablet (25 mg) by mouth daily  Dispense: 90 tablet; Refill: 1  - potassium chloride ER (KLOR-CON M) 20 MEQ CR tablet; Take 1 tablet (20 mEq) by mouth daily  Dispense: 90 tablet; Refill: 1  - Basic metabolic panel [LAB15]; Future    2. Hypercalciuria    - chlorthalidone (HYGROTON) 25 MG tablet; Take 1 tablet (25 mg) by mouth daily  Dispense: 90 tablet; Refill: 1  - potassium chloride ER (KLOR-CON M) 20 MEQ CR tablet; Take 1 tablet (20 mEq) by mouth daily  Dispense: 90 tablet; Refill: 1  - Basic metabolic panel [LAB15]; Future    3. Osteogenesis imperfecta     Discussed possible dietary and medical options to assist with stone prevention.  Based on 24 hr urine test, I counseled regarding: Adequate fluid volume, normal dietary calcium, and adequate fruits and vegetable content in her diet which she is currently doing.    Given the significant degree of hypercalciuria that was also present to a lesser degree in 2019, discussed using a thiazide diuretic to help reduce hypercalciuria.  Discussed how this medication works and potential side effects such as hypertension, hypokalemia, long-term risk of developing insulin resistance.    Also  discussed ureteroscopy as a option for managing her stone disease. Discussed risks, including but not limited to, bleeding, infection, need for stent/stent related symptoms, injury to ureter, need for second procedure.  She will consider this and let us know if she is interested.      Plan:  -Will start chlorthalidone 25 mg a day and supplement with 20 mEq of potassium chloride daily.  -BMP in 1 to 2 weeks  -Repeat with [facility] in 6 weeks with follow-up visit    Total time spent within encounter on the day of visit of 30 minutes, including patient time, chart review, review of test results, and documentation.    Orders Placed This Encounter   Procedures     Basic metabolic panel [LAB15]       Tony Gonzalez MD  January 3, 2022         Chief Complaint: Stone follow-up    History of Present Illness:  Mel Reeves is a 51 year old female seen for follow-up of nephrolithiasis testing.    No interval change since last visit      Imaging (CT abdomen pelvis without contrast performed on November 17, 2021, images personally reviewed), demonstrated: No right calculi.  Multiple left calculi with largest being 6 mm lower pole.  Nonobstructing    Metabolic:  Medical risk factors: Osteogenesis imperfecta  Stone analysis: 20% COM, 20% CAP    Blood work:  PTH on November 17, 2021 was 25 (normal)    Sodium   Date Value Ref Range Status   11/17/2021 139 133 - 144 mmol/L Final   05/21/2019 142 133 - 144 mmol/L Final     Potassium   Date Value Ref Range Status   11/17/2021 3.8 3.4 - 5.3 mmol/L Final   05/21/2019 3.8 3.4 - 5.3 mmol/L Final     Chloride   Date Value Ref Range Status   11/17/2021 105 94 - 109 mmol/L Final   05/21/2019 112 (H) 94 - 109 mmol/L Final     Creatinine   Date Value Ref Range Status   11/17/2021 0.63 0.52 - 1.04 mg/dL Final   05/21/2019 0.60 0.52 - 1.04 mg/dL Final     Calcium   Date Value Ref Range Status   11/17/2021 9.4 8.5 - 10.1 mg/dL Final   05/21/2019 7.8 (L) 8.5 - 10.1 mg/dL Final     Phosphorus  "  Date Value Ref Range Status   11/17/2021 3.4 2.5 - 4.5 mg/dL Final        24 hr urine study:  + adequate collection  -- Low urine volume (2.48 L)  + Hypercalciuria (Ca/Cr Ratio 327)  -- Hyperoxaluria  -- Hypocitraturia  -- Hypernatriuria  -- Aciduria  -- Alkaluria  -- Hypomagnesuria         Allergies:  Allergies   Allergen Reactions     Bactrim [Sulfamethoxazole W/Trimethoprim] Hives     Sulfamethoxazole-Trimethoprim Hives     Sulfa Drugs Hives     Zithromax [Azithromycin] GI Disturbance            Medications:  Current Outpatient Medications   Medication Sig     chlorthalidone (HYGROTON) 25 MG tablet Take 1 tablet (25 mg) by mouth daily     potassium chloride ER (KLOR-CON M) 20 MEQ CR tablet Take 1 tablet (20 mEq) by mouth daily     SUMAtriptan (IMITREX) 50 MG tablet Take 50 mg by mouth at onset of headache for migraine     No current facility-administered medications for this visit.       Review of Systems:   ROS: 10 point ROS neg other than the symptoms noted above in the HPI.    Physical Exam:  B/P: Data Unavailable, T: Data Unavailable, P: Data Unavailable, R: Data Unavailable  Estimated body mass index is 21.13 kg/m  as calculated from the following:    Height as of this encounter: 1.651 m (5' 5\").    Weight as of this encounter: 57.6 kg (127 lb).  General Appearance Adult: Alert, no acute distress, oriented  Lungs: no respiratory distress, or pursed lip breathing  Neuro: Alert, oriented, speech and mentation normal  Psych: affect and mood normal        "

## 2022-01-03 NOTE — LETTER
1/3/2022       RE: Mel Reeves  [address]  [facility] [address]     Dear Colleague,    Thank you for referring your patient, Mel Reeves, to the [facility] [place] at [facility]. Please see a copy of my visit note below.    Mel is a 51 year old who is being evaluated via a billable video visit.      How would you like to obtain your AVS? MyChart  If the video visit is dropped, the invitation should be resent by: Text to cell phone: [phone]  Will anyone else be joining your video visit? No      Video Start Time: 2:20 pM  Video-Visit Details    Type of service:  Video Visit    Video End Time:2:43 PM    Originating Location (pt. Location): Home    Distant Location (provider location):  [facility] [place]     Platform used for Video Visit: DelaGet     Name: Mel Reeves   MRN: [id number]  YOB: 1970    Assessment and Plan:  51 year old female with left nephrolithiasis and hypercalciuria.    1. Kidney stone on left side    - chlorthalidone (HYGROTON) 25 MG tablet; Take 1 tablet (25 mg) by mouth daily  Dispense: 90 tablet; Refill: 1  - potassium chloride ER (KLOR-CON M) 20 MEQ CR tablet; Take 1 tablet (20 mEq) by mouth daily  Dispense: 90 tablet; Refill: 1  - Basic metabolic panel [LAB15]; Future    2. Hypercalciuria    - chlorthalidone (HYGROTON) 25 MG tablet; Take 1 tablet (25 mg) by mouth daily  Dispense: 90 tablet; Refill: 1  - potassium chloride ER (KLOR-CON M) 20 MEQ CR tablet; Take 1 tablet (20 mEq) by mouth daily  Dispense: 90 tablet; Refill: 1  - Basic metabolic panel [LAB15]; Future    3. Osteogenesis imperfecta     Discussed possible dietary and medical options to assist with stone prevention.  Based on 24 hr urine test, I counseled regarding: Adequate fluid volume, normal dietary calcium, and adequate fruits and vegetable content in her diet which she is currently doing.    Given the significant  degree of hypercalciuria that was also present to a lesser degree in 2019, discussed using a thiazide diuretic to help reduce hypercalciuria.  Discussed how this medication works and potential side effects such as hypertension, hypokalemia, long-term risk of developing insulin resistance.    Also discussed ureteroscopy as a option for managing her stone disease. Discussed risks, including but not limited to, bleeding, infection, need for stent/stent related symptoms, injury to ureter, need for second procedure.  She will consider this and let us know if she is interested.      Plan:  -Will start chlorthalidone 25 mg a day and supplement with 20 mEq of potassium chloride daily.  -BMP in 1 to 2 weeks  -Repeat with [facility] in 6 weeks with follow-up visit    Total time spent within encounter on the day of visit of 30 minutes, including patient time, chart review, review of test results, and documentation.    Orders Placed This Encounter   Procedures     Basic metabolic panel [LAB15]       Tony Gonzalez MD  January 3, 2022         Chief Complaint: Stone follow-up    History of Present Illness:  Mel Reeves is a 51 year old female seen for follow-up of nephrolithiasis testing.    No interval change since last visit      Imaging (CT abdomen pelvis without contrast performed on November 17, 2021, images personally reviewed), demonstrated: No right calculi.  Multiple left calculi with largest being 6 mm lower pole.  Nonobstructing    Metabolic:  Medical risk factors: Osteogenesis imperfecta  Stone analysis: 20% COM, 20% CAP    Blood work:  PTH on November 17, 2021 was 25 (normal)    Sodium   Date Value Ref Range Status   11/17/2021 139 133 - 144 mmol/L Final   05/21/2019 142 133 - 144 mmol/L Final     Potassium   Date Value Ref Range Status   11/17/2021 3.8 3.4 - 5.3 mmol/L Final   05/21/2019 3.8 3.4 - 5.3 mmol/L Final     Chloride   Date Value Ref Range Status   11/17/2021 105 94 - 109 mmol/L Final   05/21/2019 112  "(H) 94 - 109 mmol/L Final     Creatinine   Date Value Ref Range Status   11/17/2021 0.63 0.52 - 1.04 mg/dL Final   05/21/2019 0.60 0.52 - 1.04 mg/dL Final     Calcium   Date Value Ref Range Status   11/17/2021 9.4 8.5 - 10.1 mg/dL Final   05/21/2019 7.8 (L) 8.5 - 10.1 mg/dL Final     Phosphorus   Date Value Ref Range Status   11/17/2021 3.4 2.5 - 4.5 mg/dL Final        24 hr urine study:  + adequate collection  -- Low urine volume (2.48 L)  + Hypercalciuria (Ca/Cr Ratio 327)  -- Hyperoxaluria  -- Hypocitraturia  -- Hypernatriuria  -- Aciduria  -- Alkaluria  -- Hypomagnesuria         Allergies:  Allergies   Allergen Reactions     Bactrim [Sulfamethoxazole W/Trimethoprim] Hives     Sulfamethoxazole-Trimethoprim Hives     Sulfa Drugs Hives     Zithromax [Azithromycin] GI Disturbance            Medications:  Current Outpatient Medications   Medication Sig     chlorthalidone (HYGROTON) 25 MG tablet Take 1 tablet (25 mg) by mouth daily     potassium chloride ER (KLOR-CON M) 20 MEQ CR tablet Take 1 tablet (20 mEq) by mouth daily     SUMAtriptan (IMITREX) 50 MG tablet Take 50 mg by mouth at onset of headache for migraine     No current facility-administered medications for this visit.       Review of Systems:   ROS: 10 point ROS neg other than the symptoms noted above in the HPI.    Physical Exam:  B/P: Data Unavailable, T: Data Unavailable, P: Data Unavailable, R: Data Unavailable  Estimated body mass index is 21.13 kg/m  as calculated from the following:    Height as of this encounter: 1.651 m (5' 5\").    Weight as of this encounter: 57.6 kg (127 lb).  General Appearance Adult: Alert, no acute distress, oriented  Lungs: no respiratory distress, or pursed lip breathing  Neuro: Alert, oriented, speech and mentation normal  Psych: affect and mood normal      "

## 2022-03-21 DIAGNOSIS — N20.0 CALCULUS OF KIDNEY: Primary | ICD-10-CM

## 2022-03-22 ENCOUNTER — HOSPITAL ENCOUNTER (OUTPATIENT)
Dept: CT IMAGING | Facility: CLINIC | Age: 52
Discharge: HOME | End: 2022-03-22
Attending: UROLOGY | Admitting: UROLOGY
Payer: COMMERCIAL

## 2022-03-22 DIAGNOSIS — N20.0 CALCULUS OF KIDNEY: ICD-10-CM

## 2022-03-22 PROCEDURE — 74176 CT ABD & PELVIS W/O CONTRAST: CPT

## 2022-03-27 ENCOUNTER — APPOINTMENT (OUTPATIENT)
Dept: CT IMAGING | Facility: CLINIC | Age: 52
End: 2022-03-27
Attending: PHYSICIAN ASSISTANT
Payer: COMMERCIAL

## 2022-03-27 ENCOUNTER — HOSPITAL ENCOUNTER (EMERGENCY)
Facility: CLINIC | Age: 52
Discharge: HOME | End: 2022-03-27
Attending: PHYSICIAN ASSISTANT | Admitting: PHYSICIAN ASSISTANT
Payer: COMMERCIAL

## 2022-03-27 ENCOUNTER — APPOINTMENT (OUTPATIENT)
Dept: ULTRASOUND IMAGING | Facility: CLINIC | Age: 52
End: 2022-03-27
Attending: PHYSICIAN ASSISTANT
Payer: COMMERCIAL

## 2022-03-27 VITALS
TEMPERATURE: 98.6 F | DIASTOLIC BLOOD PRESSURE: 63 MMHG | HEART RATE: 60 BPM | SYSTOLIC BLOOD PRESSURE: 105 MMHG | OXYGEN SATURATION: 96 % | RESPIRATION RATE: 20 BRPM

## 2022-03-27 DIAGNOSIS — R10.84 ABDOMINAL PAIN, GENERALIZED: ICD-10-CM

## 2022-03-27 DIAGNOSIS — R51.9 NONINTRACTABLE HEADACHE, UNSPECIFIED CHRONICITY PATTERN, UNSPECIFIED HEADACHE TYPE: ICD-10-CM

## 2022-03-27 LAB
ALBUMIN SERPL-MCNC: 3.5 G/DL (ref 3.4–5)
ALBUMIN UR-MCNC: NEGATIVE MG/DL
ALP SERPL-CCNC: 60 U/L (ref 40–150)
ALT SERPL W P-5'-P-CCNC: 25 U/L (ref 0–50)
ANION GAP SERPL CALCULATED.3IONS-SCNC: 4 MMOL/L (ref 3–14)
APPEARANCE UR: CLEAR
AST SERPL W P-5'-P-CCNC: 22 U/L (ref 0–45)
BASOPHILS # BLD AUTO: 0 10E3/UL (ref 0–0.2)
BASOPHILS NFR BLD AUTO: 0 %
BILIRUB SERPL-MCNC: 0.2 MG/DL (ref 0.2–1.3)
BILIRUB UR QL STRIP: NEGATIVE
BUN SERPL-MCNC: 13 MG/DL (ref 7–30)
CALCIUM SERPL-MCNC: 9 MG/DL (ref 8.5–10.1)
CHLORIDE BLD-SCNC: 108 MMOL/L (ref 94–109)
CO2 SERPL-SCNC: 28 MMOL/L (ref 20–32)
COLOR UR AUTO: ABNORMAL
CREAT SERPL-MCNC: 0.98 MG/DL (ref 0.52–1.04)
EOSINOPHIL # BLD AUTO: 0.1 10E3/UL (ref 0–0.7)
EOSINOPHIL NFR BLD AUTO: 1 %
ERYTHROCYTE [DISTWIDTH] IN BLOOD BY AUTOMATED COUNT: 13 % (ref 10–15)
GFR SERPL CREATININE-BSD FRML MDRD: 70 ML/MIN/1.73M2
GLUCOSE BLD-MCNC: 109 MG/DL (ref 70–99)
GLUCOSE UR STRIP-MCNC: NEGATIVE MG/DL
HCT VFR BLD AUTO: 37.2 % (ref 35–47)
HGB BLD-MCNC: 12.2 G/DL (ref 11.7–15.7)
HGB UR QL STRIP: ABNORMAL
HOLD SPECIMEN: NORMAL
IMM GRANULOCYTES # BLD: 0.1 10E3/UL
IMM GRANULOCYTES NFR BLD: 1 %
KETONES UR STRIP-MCNC: 40 MG/DL
LEUKOCYTE ESTERASE UR QL STRIP: NEGATIVE
LIPASE SERPL-CCNC: 86 U/L (ref 73–393)
LYMPHOCYTES # BLD AUTO: 0.8 10E3/UL (ref 0.8–5.3)
LYMPHOCYTES NFR BLD AUTO: 8 %
MCH RBC QN AUTO: 27.7 PG (ref 26.5–33)
MCHC RBC AUTO-ENTMCNC: 32.8 G/DL (ref 31.5–36.5)
MCV RBC AUTO: 84 FL (ref 78–100)
MONOCYTES # BLD AUTO: 0.4 10E3/UL (ref 0–1.3)
MONOCYTES NFR BLD AUTO: 4 %
MUCOUS THREADS #/AREA URNS LPF: PRESENT /LPF
NEUTROPHILS # BLD AUTO: 9.5 10E3/UL (ref 1.6–8.3)
NEUTROPHILS NFR BLD AUTO: 86 %
NITRATE UR QL: NEGATIVE
NRBC # BLD AUTO: 0 10E3/UL
NRBC BLD AUTO-RTO: 0 /100
PH UR STRIP: 5.5 [PH] (ref 5–7)
PLATELET # BLD AUTO: 363 10E3/UL (ref 150–450)
POTASSIUM BLD-SCNC: 3.7 MMOL/L (ref 3.4–5.3)
PROT SERPL-MCNC: 7.1 G/DL (ref 6.8–8.8)
RBC # BLD AUTO: 4.41 10E6/UL (ref 3.8–5.2)
RBC URINE: 3 /HPF
SODIUM SERPL-SCNC: 140 MMOL/L (ref 133–144)
SP GR UR STRIP: 1.01 (ref 1–1.03)
SQUAMOUS EPITHELIAL: <1 /HPF
UROBILINOGEN UR STRIP-MCNC: NORMAL MG/DL
WBC # BLD AUTO: 10.9 10E3/UL (ref 4–11)
WBC URINE: 1 /HPF

## 2022-03-27 PROCEDURE — 83690 ASSAY OF LIPASE: CPT | Performed by: PHYSICIAN ASSISTANT

## 2022-03-27 PROCEDURE — 99285 EMERGENCY DEPT VISIT HI MDM: CPT | Mod: 25

## 2022-03-27 PROCEDURE — 70450 CT HEAD/BRAIN W/O DYE: CPT

## 2022-03-27 PROCEDURE — 258N000003 HC RX IP 258 OP 636: Performed by: PHYSICIAN ASSISTANT

## 2022-03-27 PROCEDURE — 96375 TX/PRO/DX INJ NEW DRUG ADDON: CPT

## 2022-03-27 PROCEDURE — 85025 COMPLETE CBC W/AUTO DIFF WBC: CPT | Performed by: PHYSICIAN ASSISTANT

## 2022-03-27 PROCEDURE — 81003 URINALYSIS AUTO W/O SCOPE: CPT | Performed by: PHYSICIAN ASSISTANT

## 2022-03-27 PROCEDURE — 96361 HYDRATE IV INFUSION ADD-ON: CPT

## 2022-03-27 PROCEDURE — 80053 COMPREHEN METABOLIC PANEL: CPT | Performed by: PHYSICIAN ASSISTANT

## 2022-03-27 PROCEDURE — 36415 COLL VENOUS BLD VENIPUNCTURE: CPT | Performed by: PHYSICIAN ASSISTANT

## 2022-03-27 PROCEDURE — 96374 THER/PROPH/DIAG INJ IV PUSH: CPT

## 2022-03-27 PROCEDURE — 76856 US EXAM PELVIC COMPLETE: CPT

## 2022-03-27 PROCEDURE — 250N000011 HC RX IP 250 OP 636: Performed by: PHYSICIAN ASSISTANT

## 2022-03-27 RX ORDER — DIPHENHYDRAMINE HYDROCHLORIDE 50 MG/ML
25 INJECTION INTRAMUSCULAR; INTRAVENOUS ONCE
Status: COMPLETED | OUTPATIENT
Start: 2022-03-27 | End: 2022-03-27

## 2022-03-27 RX ORDER — METOCLOPRAMIDE 5 MG/1
5 TABLET ORAL 3 TIMES DAILY PRN
Qty: 15 TABLET | Refills: 0 | Status: SHIPPED | OUTPATIENT
Start: 2022-03-27 | End: 2023-10-31

## 2022-03-27 RX ORDER — SODIUM CHLORIDE 9 MG/ML
INJECTION, SOLUTION INTRAVENOUS CONTINUOUS
Status: DISCONTINUED | OUTPATIENT
Start: 2022-03-27 | End: 2022-03-27 | Stop reason: HOSPADM

## 2022-03-27 RX ORDER — METOCLOPRAMIDE HYDROCHLORIDE 5 MG/ML
10 INJECTION INTRAMUSCULAR; INTRAVENOUS ONCE
Status: COMPLETED | OUTPATIENT
Start: 2022-03-27 | End: 2022-03-27

## 2022-03-27 RX ORDER — KETOROLAC TROMETHAMINE 15 MG/ML
15 INJECTION, SOLUTION INTRAMUSCULAR; INTRAVENOUS ONCE
Status: COMPLETED | OUTPATIENT
Start: 2022-03-27 | End: 2022-03-27

## 2022-03-27 RX ADMIN — SODIUM CHLORIDE 1000 ML: 9 INJECTION, SOLUTION INTRAVENOUS at 16:21

## 2022-03-27 RX ADMIN — DIPHENHYDRAMINE HYDROCHLORIDE 25 MG: 50 INJECTION, SOLUTION INTRAMUSCULAR; INTRAVENOUS at 16:43

## 2022-03-27 RX ADMIN — KETOROLAC TROMETHAMINE 15 MG: 15 INJECTION, SOLUTION INTRAMUSCULAR; INTRAVENOUS at 16:43

## 2022-03-27 RX ADMIN — METOCLOPRAMIDE 10 MG: 5 INJECTION, SOLUTION INTRAMUSCULAR; INTRAVENOUS at 16:43

## 2022-03-27 ASSESSMENT — ENCOUNTER SYMPTOMS
BLOOD IN STOOL: 0
FLANK PAIN: 1
DIZZINESS: 1
HEADACHES: 1
DYSURIA: 0
VOMITING: 1
FEVER: 0
PHOTOPHOBIA: 1

## 2022-03-27 NOTE — ED PROVIDER NOTES
"  History   Chief Complaint:  Headache     HPI   Mel Reeves is a 51 year old female with history of migraines, bone disorder, ovarian cysts, and kidney stones who presents with headache after she was on a strike call. The patient states that starting 7 hours ago she developed a headache that came on slowly but is severe in the \"top 2\" headaches she has had along with vomiting. Denies thunderclap onset. She also has double vision, sensitivity to light, an ear ache, and dizziness. She has tried Imitrex x3 today which improved her auras but has not improved her headache. The patient notes that she had a headache yesterday as well without aura. She also tried Imitrex x2 at that time. The patient mentions that she had a similar headache about a month ago with no auras. She denies any numbness. No family history of brain aneurysm.     She also states that she developed flank pain one week ago which started on the right side of her abdomen and moved to her left side a day ago. She was seen by her urologists and had a CT scan done, see below. She was given Magnesium Citrate and notes loose stool. The patient was also prescribed Keflex but has not taken this yet. She denies a fever, blood in stool, and pain with urination. She does not have diabetes or take blood thinners. She had her last period a few months ago. She is Covid vaccinated x3.    Imaging from 3/22/22:  CT Abdomen/Pelvis without IV contrast:   1.  Stable burden of nonobstructing left renal calculi including few  small calculi along a prior nephrostomy tract. Stable punctate  nonobstructing right renal calculus.   As per radiology.    Review of Systems   Constitutional: Negative for fever.   HENT: Positive for ear pain.    Eyes: Positive for photophobia and visual disturbance.   Gastrointestinal: Positive for vomiting. Negative for blood in stool.   Genitourinary: Positive for flank pain. Negative for dysuria.   Neurological: Positive for dizziness and " headaches.   All other systems reviewed and are negative.    Allergies:  Bactrim [Sulfamethoxazole W/Trimethoprim]  Sulfa Drugs  Zithromax [Azithromycin]    Medications:  Imitrex    Past Medical History:     Kidney stones  Migraines  Osteogenesis imperfecta  Anemia  Osteoporosis  Thyroid nodule    Past Surgical History:     section  Appendectomy with ovarian cyst removal  Cystoscopy with stone removal x3  IR renal stone removal left  Laser holmium lithotripsy ureter  Nephrolithotomy  Adenoidectomy    Family History:    Father: Prostate cancer, diabetes, hyperlipidemia, hypertension  Mother: Breast cancer, kidney stones  Sister: Fibroids, kidney stones    Social History:  The patient presents to the ED with her .  The patient works as a teacher.    Physical Exam     Patient Vitals for the past 24 hrs:   BP Temp Temp src Pulse Resp SpO2   22 1715 -- -- -- -- -- 94 %   22 1700 -- -- -- -- -- 96 %   22 1630 (!) 132/110 -- -- 60 -- 100 %   22 1555 (!) 144/60 98.6  F (37  C) Oral 60 20 100 %       Physical Exam  Vitals and nursing note reviewed.   Constitutional:       General: She is not in acute distress.     Appearance: Normal appearance. She is not ill-appearing, toxic-appearing or diaphoretic.   HENT:      Head: Normocephalic and atraumatic.      Right Ear: Tympanic membrane, ear canal and external ear normal.      Left Ear: Tympanic membrane, ear canal and external ear normal.      Mouth/Throat:      Mouth: Mucous membranes are moist.      Pharynx: Oropharynx is clear. No oropharyngeal exudate or posterior oropharyngeal erythema.   Eyes:      General:         Right eye: No discharge.         Left eye: No discharge.      Extraocular Movements: Extraocular movements intact.      Conjunctiva/sclera: Conjunctivae normal.      Pupils: Pupils are equal, round, and reactive to light.   Cardiovascular:      Rate and Rhythm: Normal rate and regular rhythm.      Pulses: Normal pulses.       Heart sounds: Normal heart sounds.   Pulmonary:      Effort: Pulmonary effort is normal. No respiratory distress.      Breath sounds: Normal breath sounds.   Abdominal:      General: There is no distension.      Palpations: Abdomen is soft.      Tenderness: There is abdominal tenderness. There is no right CVA tenderness, left CVA tenderness, guarding or rebound.      Comments: Mild suprapubic TTP.    Musculoskeletal:         General: Normal range of motion.      Cervical back: Normal range of motion and neck supple. No rigidity.   Skin:     General: Skin is warm.      Capillary Refill: Capillary refill takes less than 2 seconds.   Neurological:      General: No focal deficit present.      Mental Status: She is alert.      Cranial Nerves: No cranial nerve deficit.      Sensory: No sensory deficit.      Motor: No weakness.      Coordination: Coordination normal.      Comments: Face symmetric  Clear speech  No drift  SUMNER  Sensation grossly intact to light touch to extremities    Psychiatric:         Mood and Affect: Mood normal.         Behavior: Behavior normal.         Thought Content: Thought content normal.         Judgment: Judgment normal.       Emergency Department Course     Imaging:  CT Head w/o Contrast   Final Result   IMPRESSION:   1.  No acute intracranial findings.      2.  Single small focal area of low attenuation adjacent to the posterior body of the right lateral ventricle has appearance suggesting sequela of chronic small vessel change versus sequela of a postinfectious/inflammatory process of remote nature.      US Pelvic Complete with Transvaginal   Final Result   IMPRESSION:   1.  Fibroid uterus.    2.  No evidence of acute ovarian torsion.                 Report per radiology    Laboratory:  Labs Ordered and Resulted from Time of ED Arrival to Time of ED Departure   COMPREHENSIVE METABOLIC PANEL - Abnormal       Result Value    Sodium 140      Potassium 3.7      Chloride 108      Carbon Dioxide  (CO2) 28      Anion Gap 4      Urea Nitrogen 13      Creatinine 0.98      Calcium 9.0      Glucose 109 (*)     Alkaline Phosphatase 60      AST 22      ALT 25      Protein Total 7.1      Albumin 3.5      Bilirubin Total 0.2      GFR Estimate 70     CBC WITH PLATELETS AND DIFFERENTIAL - Abnormal    WBC Count 10.9      RBC Count 4.41      Hemoglobin 12.2      Hematocrit 37.2      MCV 84      MCH 27.7      MCHC 32.8      RDW 13.0      Platelet Count 363      % Neutrophils 86      % Lymphocytes 8      % Monocytes 4      % Eosinophils 1      % Basophils 0      % Immature Granulocytes 1      NRBCs per 100 WBC 0      Absolute Neutrophils 9.5 (*)     Absolute Lymphocytes 0.8      Absolute Monocytes 0.4      Absolute Eosinophils 0.1      Absolute Basophils 0.0      Absolute Immature Granulocytes 0.1      Absolute NRBCs 0.0     ROUTINE UA WITH MICROSCOPIC REFLEX TO CULTURE - Abnormal    Color Urine Straw      Appearance Urine Clear      Glucose Urine Negative      Bilirubin Urine Negative      Ketones Urine 40  (*)     Specific Gravity Urine 1.009      Blood Urine Trace (*)     pH Urine 5.5      Protein Albumin Urine Negative      Urobilinogen Urine Normal      Nitrite Urine Negative      Leukocyte Esterase Urine Negative      Mucus Urine Present (*)     RBC Urine 3 (*)     WBC Urine 1      Squamous Epithelials Urine <1     LIPASE - Normal    Lipase 86          Emergency Department Course:         Reviewed:  I reviewed nursing notes, vitals, past medical history and Care Everywhere    Assessments:  1611 I obtained history and examined the patient as noted above.     1731 I rechecked the patient but she is at ultrasound. Explained lab findings to .      1858 I rechecked the patient. Feels much improved.     Interventions:  1621 NS 1L IV  1643 Reglan 10mg IV  1643 Benadryl 25mg IV  1643 Toradol 15mg IV    Disposition:  The patient was discharged to home.     Impression & Plan     Medical Decision Makin y/o female  presents with primary complaint of HA.  Hx of migraines and this HA feels typical although typically will resolve with tripan.  Long Hx of Has and reports Has occurring more frequently and in severity but reports no prior brain imaging.  Pt also incidentally notes intermittent abd cramping for past few weeks.  Occasional loose non-bloody stool.  Has been working with her PCP and urologist.  Had CT recently, stones in kidneys (Hx of), no ureteral stones or other worrisome findings on CT imaging.     On exam, grossly neuro intact and not acutely ill appearing.  Vitals noted.  No fevers, supple neck, normal mentation, not suspected this is meningitis or encephalitis.  Will check CT head as no prior imaging and long Hx of Has.  This is not suspected to be SAH at this time with Has feeling typical for pt.  Will medicate for pain.     Pt also notes intermittent abd cramping.  Has had labs and CT imaging recently.  Mild suprapubic TTP currently.  S/p remote appy.  Has had ovarian cysts.  D/W pt, can check pelvic U/S for reassurance.  CBC for leukocytosis/penia, anemia, and abnl platelets.  CMP to assess electrolytes and renal/liver function.  Lipase for pancreatitis. UA for infection.   Of note, seen at [facility] a week or so ago for same symptoms, UA appeared infected, Rx keflex provided, pt did not take.  I do not see urine Cx results but pt reports she was told this was sent and negative. Her abd is minimally tender.  I do not suspect this is SBO or worrisome colitis and doubt biliary etiology with no upper abd pain, etc. She is comfortable with plan.     Update: Pt feeling markedly improved and very comfortable with plan for discharge.   remains at .  We discussed all of her results in detail and ideal to Follow-up with established PCP this week. Pt and  educated on S/S that should prompt ED re-eval.  Questions answered. Verbalized understanding. Comfortable with plan and appreciative.     Diagnosis:     ICD-10-CM    1. Nonintractable headache, unspecified chronicity pattern, unspecified headache type  R51.9    2. Abdominal pain, generalized  R10.84      Discharge Medications:  New Prescriptions    METOCLOPRAMIDE (REGLAN) 5 MG TABLET    Take 1 tablet (5 mg) by mouth 3 times daily as needed (nausea or headache)     Scribe Disclosure:  I, Kaushal Navarro, am serving as a scribe at 4:12 PM on 3/27/2022 to document services personally performed by Karely Duron PA-C based on my observations and the provider's statements to me.     I, Haresh Witt, am serving as a scribe  at 6:59 PM on 3/27/2022 to document services personally performed by Karely Duron PA-C based on my observations and the provider's statements to me.            Karely Duron PA-C  03/27/22 [id number]

## 2022-07-11 ENCOUNTER — OFFICE VISIT (OUTPATIENT)
Dept: UROLOGY | Facility: CLINIC | Age: 52
End: 2022-07-11
Payer: COMMERCIAL

## 2022-07-11 VITALS
HEIGHT: 65 IN | WEIGHT: 130 LBS | BODY MASS INDEX: 21.66 KG/M2 | DIASTOLIC BLOOD PRESSURE: 72 MMHG | OXYGEN SATURATION: 98 % | SYSTOLIC BLOOD PRESSURE: 110 MMHG | HEART RATE: 75 BPM

## 2022-07-11 DIAGNOSIS — N20.0 CALCULUS OF KIDNEY: Primary | ICD-10-CM

## 2022-07-11 LAB
ALBUMIN UR-MCNC: NEGATIVE MG/DL
APPEARANCE UR: CLEAR
BILIRUB UR QL STRIP: NEGATIVE
COLOR UR AUTO: YELLOW
GLUCOSE UR STRIP-MCNC: NEGATIVE MG/DL
HGB UR QL STRIP: ABNORMAL
KETONES UR STRIP-MCNC: NEGATIVE MG/DL
LEUKOCYTE ESTERASE UR QL STRIP: NEGATIVE
NITRATE UR QL: NEGATIVE
PH UR STRIP: 6 [PH] (ref 5–7)
SP GR UR STRIP: 1.01 (ref 1–1.03)
UROBILINOGEN UR STRIP-ACNC: 0.2 E.U./DL

## 2022-07-11 PROCEDURE — 81003 URINALYSIS AUTO W/O SCOPE: CPT | Mod: QW | Performed by: UROLOGY

## 2022-07-11 PROCEDURE — 99214 OFFICE O/P EST MOD 30 MIN: CPT | Performed by: UROLOGY

## 2022-07-11 ASSESSMENT — PAIN SCALES - GENERAL: PAINLEVEL: NO PAIN (0)

## 2022-07-11 NOTE — LETTER
"7/11/2022       RE: Mel Reeves  [address] [facility] [address]     Dear Colleague,    Thank you for referring your patient, Mel Reeves, to the [facility] [place] at [facility]. Please see a copy of my visit note below.    Assessment & Plan   ASSESSMENT and PLAN  52 year old female here for reevaluation of hypercalciuria.    1. Calculus of kidney    Discussed rationale for starting chlorthalidone and potassium citrate for hypercalciuria, despite normal supersaturations.  Since she has had repeated stone episodes, I think trying therapy at this time would be warranted.    Discussed SE of hypokalemia, hyperglycemia and hypotension with patient.     Plan:  -Will start chlorthalidone 25 mg a day and supplement with 20 mEq of potassium chloride daily.  -BMP in 1 to 2 weeks  -Repeat with [facility] in 6 weeks with follow-up visit    Time spent: 20 minutes spent on the date of the encounter doing chart review, history and exam, documentation and further activities as noted above.    Tony Gonzalez MD   Urology  [facility]         Subjective     CHIEF COMPLAINT   follow-up of kidney stones and hypercalciuria.      HPI   Mel Reeves is a very pleasant 52 year old female who presents with a history of nephrolithiasis, hypercalciuria and osteogenesis imperfecta.  At last visit recoemmended to start CTD and K-cit, but she had some questions and has not started.    Since last visit, no interval flank pain, stone passage, flank pain.       Objective     PHYSICAL EXAM  Patient is a 52 year old  female   Vitals: Blood pressure 110/72, pulse 75, height 1.638 m (5' 4.5\"), weight 59 kg (130 lb), SpO2 98 %, not currently breastfeeding.  Body mass index is 21.97 kg/m .  Gen: No acute distress       "

## 2022-07-11 NOTE — NURSING NOTE
Chief Complaint   Patient presents with     Kidney Stone Related     Review of [facility] process.   Josie Dickens LPN

## 2022-07-17 NOTE — PROGRESS NOTES
"Assessment & Plan   ASSESSMENT and PLAN  52 year old female here for reevaluation of hypercalciuria.    1. Calculus of kidney    Discussed rationale for starting chlorthalidone and potassium citrate for hypercalciuria, despite normal supersaturations.  Since she has had repeated stone episodes, I think trying therapy at this time would be warranted.    Discussed SE of hypokalemia, hyperglycemia and hypotension with patient.     Plan:  -Will start chlorthalidone 25 mg a day and supplement with 20 mEq of potassium chloride daily.  -BMP in 1 to 2 weeks  -Repeat with [facility] in 6 weeks with follow-up visit    Time spent: 20 minutes spent on the date of the encounter doing chart review, history and exam, documentation and further activities as noted above.    Tony Gonzalez MD   Urology  [facility]         Subjective     CHIEF COMPLAINT   follow-up of kidney stones and hypercalciuria.      JOSE JUAN Reeves is a very pleasant 52 year old female who presents with a history of nephrolithiasis, hypercalciuria and osteogenesis imperfecta.  At last visit recoemmended to start CTD and K-cit, but she had some questions and has not started.    Since last visit, no interval flank pain, stone passage, flank pain.       Objective     PHYSICAL EXAM  Patient is a 52 year old  female   Vitals: Blood pressure 110/72, pulse 75, height 1.638 m (5' 4.5\"), weight 59 kg (130 lb), SpO2 98 %, not currently breastfeeding.  Body mass index is 21.97 kg/m .  Gen: No acute distress         "

## 2022-07-29 ENCOUNTER — LAB (OUTPATIENT)
Dept: LAB | Facility: CLINIC | Age: 52
End: 2022-07-29
Payer: COMMERCIAL

## 2022-07-29 DIAGNOSIS — R82.994 HYPERCALCIURIA: ICD-10-CM

## 2022-07-29 DIAGNOSIS — N20.0 KIDNEY STONE ON LEFT SIDE: ICD-10-CM

## 2022-07-29 LAB
ANION GAP SERPL CALCULATED.3IONS-SCNC: 10 MMOL/L (ref 3–14)
BUN SERPL-MCNC: 12 MG/DL (ref 7–30)
CALCIUM SERPL-MCNC: 9.1 MG/DL (ref 8.5–10.1)
CHLORIDE BLD-SCNC: 100 MMOL/L (ref 94–109)
CO2 SERPL-SCNC: 26 MMOL/L (ref 20–32)
CREAT SERPL-MCNC: 0.6 MG/DL (ref 0.52–1.04)
GFR SERPL CREATININE-BSD FRML MDRD: >90 ML/MIN/1.73M2
GLUCOSE BLD-MCNC: 102 MG/DL (ref 70–99)
POTASSIUM BLD-SCNC: 3.6 MMOL/L (ref 3.4–5.3)
SODIUM SERPL-SCNC: 136 MMOL/L (ref 133–144)

## 2022-07-29 PROCEDURE — 80048 BASIC METABOLIC PNL TOTAL CA: CPT

## 2022-07-29 PROCEDURE — 36415 COLL VENOUS BLD VENIPUNCTURE: CPT

## 2022-07-29 NOTE — RESULT ENCOUNTER NOTE
Your potassium is normal on the chlorthalidone and potassium supplement.  Hopefully this gets your calcium down in the urine!  Best, Dr. Gonzalez

## 2022-10-09 ENCOUNTER — HEALTH MAINTENANCE LETTER (OUTPATIENT)
Age: 52
End: 2022-10-09

## 2022-11-28 ENCOUNTER — TRANSFERRED RECORDS (OUTPATIENT)
Dept: HEALTH INFORMATION MANAGEMENT | Facility: CLINIC | Age: 52
End: 2022-11-28

## 2022-12-11 ENCOUNTER — OFFICE VISIT (OUTPATIENT)
Dept: URGENT CARE | Facility: URGENT CARE | Age: 52
End: 2022-12-11
Payer: COMMERCIAL

## 2022-12-11 VITALS
HEART RATE: 104 BPM | DIASTOLIC BLOOD PRESSURE: 73 MMHG | BODY MASS INDEX: 21.97 KG/M2 | SYSTOLIC BLOOD PRESSURE: 119 MMHG | TEMPERATURE: 97.6 F | OXYGEN SATURATION: 98 % | WEIGHT: 130 LBS

## 2022-12-11 DIAGNOSIS — R07.0 THROAT PAIN: Primary | ICD-10-CM

## 2022-12-11 DIAGNOSIS — J02.0 STREPTOCOCCAL SORE THROAT: ICD-10-CM

## 2022-12-11 LAB — DEPRECATED S PYO AG THROAT QL EIA: POSITIVE

## 2022-12-11 PROCEDURE — 99203 OFFICE O/P NEW LOW 30 MIN: CPT | Performed by: PHYSICIAN ASSISTANT

## 2022-12-11 PROCEDURE — 87880 STREP A ASSAY W/OPTIC: CPT | Performed by: PHYSICIAN ASSISTANT

## 2022-12-11 RX ORDER — PENICILLIN V POTASSIUM 500 MG/1
500 TABLET, FILM COATED ORAL 2 TIMES DAILY
Qty: 20 TABLET | Refills: 0 | Status: SHIPPED | OUTPATIENT
Start: 2022-12-11 | End: 2022-12-21

## 2022-12-11 NOTE — PROGRESS NOTES
Assessment & Plan     1. Throat pain  - Streptococcus A Rapid Screen w/Reflex to PCR - Clinic Collect    2. Streptococcal sore throat  Rapid strep test is positive.  No evidence of PTA, RPA or deep neck space abscess.  Treatment with medication as below.  Discussed that she will be contagious for the next 24 hours.  Fluids, rest, salt water gargles, ibuprofen/Tylenol for comfort.  - penicillin V (VEETID) 500 MG tablet; Take 1 tablet (500 mg) by mouth 2 times daily for 10 days  Dispense: 20 tablet; Refill: 0        Return in about 3 days (around 12/14/2022), or if symptoms worsen or fail to improve.    Diagnosis and treatment plan was reviewed with patient and/or family.   We went over any labs or imaging. Discussed worsening symptoms or little to no relief despite treatment plan to follow-up with PCP or return to clinic.  Patient verbalizes understanding. All questions were addressed and answered.     Radha Victor, HARMEET  [facility]RG[facility] [place]    CHIEF COMPLAINT:   Chief Complaint   Patient presents with     Urgent Care     Pharyngitis     C/O sore throat for 1 day     Subjective     Mel is a 52 year old female who presents to clinic today for evaluation of sore throat for one day.  Started yesterday, and worsened today.  No fever or chills.  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      Past Medical History:   Diagnosis Date     Kidney stones      Migraines      Migraines      Motion sickness      Osteogenesis imperfecta      PONV (postoperative nausea and vomiting)      Past Surgical History:   Procedure Laterality Date     ABDOMEN SURGERY      CS     APPENDECTOMY  '92    with ovarian cyst removal     COMBINED CYSTOSCOPY, RETROGRADES, URETEROSCOPY, LASER HOLMIUM LITHOTRIPSY URETER(S), INSERT STENT Right 4/8/2019    Procedure: CYSTOSCOPY, RIGHT RETROGRADES, PYELOGRAM, RIGHT URETEROSCOPY, LASER HOLMIUM LITHOTRIPSY BASKET REMOVAL OF STONES AND RIGHT STENT PLACEMENT;  Surgeon: Ashley  Warner NGUYEN MD;  Location:  OR     CYSTOSCOPY      stone removal     CYSTOSCOPY, URETEROSCOPY, COMBINED Left 5/20/2019    Procedure: Cystoscopy, flexible  ureteroscopy, RETROGRADES, URETER STENT PLACENMENT.;  Surgeon: Warner Ramirez MD;  Location:  OR     GYN SURGERY  '92    ovarian cyst removal     IR RENAL STONE REMOVAL LEFT  5/20/2019     LASER HOLMIUM LITHOTRIPSY URETER(S), INSERT STENT, COMBINED Left 6/19/2019    Procedure: Cystoscopy, LEFT retrograde pyelogram, LEFT ureteral stent removal, LEFT ureteroscopy with laser lithotripsy, LEFT ureteral stent placement,  stone basketing with LEFT kidney stone removal;  Surgeon: Warner Ramirez MD;  Location:  OR     PERCUTANEOUS NEPHROLITHOTOMY Left 5/20/2019    Procedure: LEFT PERCUTANEOUS NEPHROLITHOTOMY, ,;  Surgeon: Warner Ramirez MD;  Location:  OR     Social History     Tobacco Use     Smoking status: Never     Smokeless tobacco: Never   Substance Use Topics     Alcohol use: Yes     Comment: minimally     Current Outpatient Medications   Medication     chlorthalidone (HYGROTON) 25 MG tablet     penicillin V (VEETID) 500 MG tablet     potassium chloride ER (KLOR-CON M) 20 MEQ CR tablet     metoclopramide (REGLAN) 5 MG tablet     SUMAtriptan (IMITREX) 50 MG tablet     No current facility-administered medications for this visit.     Allergies   Allergen Reactions     Bactrim [Sulfamethoxazole W/Trimethoprim] Hives     Sulfamethoxazole-Trimethoprim Hives     Sulfa Drugs Hives     Zithromax [Azithromycin] GI Disturbance       10 point ROS of systems were all negative except for pertinent positives noted in my HPI.      Exam:   /73   Pulse 104   Temp 97.6  F (36.4  C) (Tympanic)   Wt 59 kg (130 lb)   LMP 11/21/2022   SpO2 98%   BMI 21.97 kg/m    Constitutional: healthy, alert and no distress  Head: Normocephalic, atraumatic.  Eyes: conjunctiva clear, no drainage  ENT: TMs clear and shiny kathleen, nasal mucosa pink and moist, throat with erythema and  slight swelling. NO trismus or drooling.   Neck: neck is supple, no cervical lymphadenopathy or nuchal rigidity  Cardiovascular: RRR  Respiratory: CTA bilaterally, no rhonchi or rales  Skin: no rashes  Neurologic: Speech clear, gait normal. Moves all extremities.    Results for orders placed or performed in visit on 12/11/22   Streptococcus A Rapid Screen w/Reflex to PCR - Clinic Collect     Status: Abnormal    Specimen: Throat; Swab   Result Value Ref Range    Group A Strep antigen Positive (A) Negative

## 2023-02-12 ENCOUNTER — HEALTH MAINTENANCE LETTER (OUTPATIENT)
Age: 53
End: 2023-02-12

## 2023-07-24 DIAGNOSIS — N20.0 CALCULUS OF KIDNEY: Primary | ICD-10-CM

## 2023-07-31 ENCOUNTER — TELEPHONE (OUTPATIENT)
Dept: UROLOGY | Facility: CLINIC | Age: 53
End: 2023-07-31

## 2023-07-31 NOTE — TELEPHONE ENCOUNTER
[facility] Call Center    Phone Message    May a detailed message be left on voicemail: yes     Reason for Call: Other: .  Patient would like the medications that were discussed on mychart from 07/19 please call back or [place]  with updates and send to [place] [id number] [place] [address]     Action Taken: Message routed to:  Other: URO    Travel Screening: Not Applicable

## 2023-08-03 DIAGNOSIS — R82.994 HYPERCALCIURIA: ICD-10-CM

## 2023-08-03 DIAGNOSIS — N20.0 KIDNEY STONE ON LEFT SIDE: ICD-10-CM

## 2023-08-09 RX ORDER — CHLORTHALIDONE 25 MG/1
25 TABLET ORAL DAILY
Qty: 90 TABLET | Refills: 1 | Status: SHIPPED | OUTPATIENT
Start: 2023-08-09

## 2023-08-09 RX ORDER — POTASSIUM CHLORIDE 1500 MG/1
20 TABLET, EXTENDED RELEASE ORAL DAILY
Qty: 90 TABLET | Refills: 1 | Status: SHIPPED | OUTPATIENT
Start: 2023-08-09

## 2023-10-02 ENCOUNTER — TRANSFERRED RECORDS (OUTPATIENT)
Dept: HEALTH INFORMATION MANAGEMENT | Facility: CLINIC | Age: 53
End: 2023-10-02

## 2023-10-02 ENCOUNTER — LAB (OUTPATIENT)
Dept: LAB | Facility: CLINIC | Age: 53
End: 2023-10-02
Payer: COMMERCIAL

## 2023-10-02 DIAGNOSIS — N20.0 KIDNEY STONE ON LEFT SIDE: ICD-10-CM

## 2023-10-02 DIAGNOSIS — R82.994 HYPERCALCIURIA: ICD-10-CM

## 2023-10-02 PROCEDURE — 36415 COLL VENOUS BLD VENIPUNCTURE: CPT

## 2023-10-02 PROCEDURE — 80048 BASIC METABOLIC PNL TOTAL CA: CPT

## 2023-10-03 LAB
ANION GAP SERPL CALCULATED.3IONS-SCNC: 11 MMOL/L (ref 7–15)
BUN SERPL-MCNC: 11.3 MG/DL (ref 6–20)
CALCIUM SERPL-MCNC: 9.7 MG/DL (ref 8.6–10)
CHLORIDE SERPL-SCNC: 99 MMOL/L (ref 98–107)
CREAT SERPL-MCNC: 0.65 MG/DL (ref 0.51–0.95)
DEPRECATED HCO3 PLAS-SCNC: 26 MMOL/L (ref 22–29)
EGFRCR SERPLBLD CKD-EPI 2021: >90 ML/MIN/1.73M2
GLUCOSE SERPL-MCNC: 147 MG/DL (ref 70–99)
POTASSIUM SERPL-SCNC: 3.8 MMOL/L (ref 3.4–5.3)
SODIUM SERPL-SCNC: 136 MMOL/L (ref 135–145)

## 2023-10-31 ENCOUNTER — VIRTUAL VISIT (OUTPATIENT)
Dept: UROLOGY | Facility: CLINIC | Age: 53
End: 2023-10-31
Payer: COMMERCIAL

## 2023-10-31 DIAGNOSIS — N20.0 CALCULUS OF KIDNEY: Primary | ICD-10-CM

## 2023-10-31 DIAGNOSIS — R82.994 HYPERCALCIURIA: ICD-10-CM

## 2023-10-31 PROCEDURE — 99214 OFFICE O/P EST MOD 30 MIN: CPT | Mod: GT | Performed by: UROLOGY

## 2023-10-31 RX ORDER — POTASSIUM CHLORIDE 1.5 G/1.58G
20 POWDER, FOR SOLUTION ORAL DAILY
Qty: 30 PACKET | Refills: 11 | Status: SHIPPED | OUTPATIENT
Start: 2023-10-31 | End: 2024-09-18

## 2023-10-31 NOTE — LETTER
10/31/2023       RE: Mel Reeves  [address]  [facility] [address]     Dear Colleague,    Thank you for referring your patient, Mel Reeves, to the [facility] [place] [facility]. Please see a copy of my visit note below.    Virtual Visit Details    Type of service:  Video Visit     Originating Location (pt. Location): Home    Distant Location (provider location):  On-site  Platform used for Video Visit: [facility]    Name: Mel Reeves   MRN: [id number]  YOB: 1970    Assessment and Plan:  53 year old female with history of calcium based nephrolithiasis and hypercalciuria without any evidence of clinical worsening of stone disease.     1.  Calcium based nephrolithiasis, stable  2.  Hypercalciuria, stable  3.  Dietary counseling management    Discussed possible dietary and medical options to assist with stone prevention.  Based on stone analysis and 24 hr urine test, I counseled regarding:    Adequate fluid intake with goal of 64 to 80 fluid ounces a day for a goal urine output of 2.5 liters (80 oz) per day.  Discussed strategies to increase fluid intake, such as gradual increases of 4-8 fl oz over the period of weeks to get this goal after creating a log to see what baseline fluid intake is. and Adequate dietary calcium of 1000 to 1200 mg a day.  Discussed how low dietary calcium intake may lead to increased stone formation.  Discussed using half of the next strength Tums with meals to supplement her calcium intake.    Regarding the potassium chloride supplementation, we can have her try potassium chloride packets instead of the pills to see if this is easier to ingest.  Sent a prescription for this and she will follow-up with us if this is helpful.    She had an elevated random glucose, however this was not fasting.  We will do a fasting BMP to screen for hyperglycemia as it can be a a side effect of chlorthalidone.      Plan:  -Dietary Recs: increase fluid intake and increase calcium  -Medication Recs: chlorthalidone 25 mg per day and potassium chloride packets 20 mill equivalents per day  -24-hour urine study in 6 months with follow-up visit  -imaging (CT) now with MyChart follow-up of results  -Fasting BMP with MyChart follow-up of results    Tony Gonzalez MD  October 31, 2023         Chief Complaint: Stone follow-up    History of Present Illness:  Mel Reeves is a 53 year old female seen with a history of calcium kidney stones with a history of hypercalciuria.  Does have osteogenesis imperfecta.  Patient most recently underwent PCNL and URS in 2019 for stone disease.      She has a history of hypercalciuria and has been on chlorthalidone 25 mg a day and potassium citrate 20 mill equivalents daily.  She had stopped the chlorthalidone from February 2 August after she had COVID but has restarted over the last 3 months.  She does not have trouble with the chlorthalidone from side effect profile but does have difficulty swallowing the potassium chloride pills.  Had potassium checked in October and was unremarkable  (Noted below).    No stone passage events, flank pain, or hematuria since last visit in July 2022.  No new imaging since 2022.    Metabolic:  Stone risk factors: Osteogenesis imperfecta  Blood work:  Potassium   Date Value Ref Range Status   10/02/2023 3.8 3.4 - 5.3 mmol/L Final   07/29/2022 3.6 3.4 - 5.3 mmol/L Final   05/21/2019 3.8 3.4 - 5.3 mmol/L Final     Creatinine   Date Value Ref Range Status   10/02/2023 0.65 0.51 - 0.95 mg/dL Final   05/21/2019 0.60 0.52 - 1.04 mg/dL Final     Calcium   Date Value Ref Range Status   10/02/2023 9.7 8.6 - 10.0 mg/dL Final   05/21/2019 7.8 (L) 8.5 - 10.1 mg/dL Final     Phosphorus   Date Value Ref Range Status   11/17/2021 3.4 2.5 - 4.5 mg/dL Final        24 hr urine study            Allergies:  Allergies   Allergen Reactions    Bactrim [Sulfamethoxazole-Trimethoprim] Hives     "Sulfamethoxazole-Trimethoprim Hives    Sulfa Antibiotics Hives    Zithromax [Azithromycin] GI Disturbance            Medications:  Current Outpatient Medications   Medication Sig    chlorthalidone (HYGROTON) 25 MG tablet TAKE 1 TABLET BY MOUTH EVERY DAY    KLOR-CON 20 MEQ CR tablet TAKE 1 TABLET BY MOUTH DAILY    metoclopramide (REGLAN) 5 MG tablet Take 1 tablet (5 mg) by mouth 3 times daily as needed (nausea or headache) (Patient not taking: Reported on 12/11/2022)    SUMAtriptan (IMITREX) 50 MG tablet Take 50 mg by mouth at onset of headache for migraine (Patient not taking: Reported on 12/11/2022)     No current facility-administered medications for this visit.       Review of Systems:   ROS: 10 point ROS neg other than the symptoms noted above in the HPI.    Physical Exam:  B/P: Data Unavailable, T: Data Unavailable, P: Data Unavailable, R: Data Unavailable  Estimated body mass index is 21.97 kg/m  as calculated from the following:    Height as of 7/11/22: 1.638 m (5' 4.5\").    Weight as of 12/11/22: 59 kg (130 lb).  General: age-appropriate appearing female in NAD.  "

## 2023-10-31 NOTE — PROGRESS NOTES
Virtual Visit Details    Type of service:  Video Visit     Originating Location (pt. Location): Home    Distant Location (provider location):  On-site  Platform used for Video Visit: [facility]    Name: Mel Reeves   MRN: [id number]  YOB: 1970    Assessment and Plan:  53 year old female with history of calcium based nephrolithiasis and hypercalciuria without any evidence of clinical worsening of stone disease.     1.  Calcium based nephrolithiasis, stable  2.  Hypercalciuria, stable  3.  Dietary counseling management    Discussed possible dietary and medical options to assist with stone prevention.  Based on stone analysis and 24 hr urine test, I counseled regarding:    Adequate fluid intake with goal of 64 to 80 fluid ounces a day for a goal urine output of 2.5 liters (80 oz) per day.  Discussed strategies to increase fluid intake, such as gradual increases of 4-8 fl oz over the period of weeks to get this goal after creating a log to see what baseline fluid intake is. and Adequate dietary calcium of 1000 to 1200 mg a day.  Discussed how low dietary calcium intake may lead to increased stone formation.  Discussed using half of the next strength Tums with meals to supplement her calcium intake.    Regarding the potassium chloride supplementation, we can have her try potassium chloride packets instead of the pills to see if this is easier to ingest.  Sent a prescription for this and she will follow-up with us if this is helpful.    She had an elevated random glucose, however this was not fasting.  We will do a fasting BMP to screen for hyperglycemia as it can be a a side effect of chlorthalidone.     Plan:  -Dietary Recs: increase fluid intake and increase calcium  -Medication Recs: chlorthalidone 25 mg per day and potassium chloride packets 20 mill equivalents per day  -24-hour urine study in 6 months with follow-up visit  -imaging (CT) now with [facility] follow-up of results  -Fasting BMP with [facility]  follow-up of results    Tony Gonzalez MD  October 31, 2023         Chief Complaint: Stone follow-up    History of Present Illness:  Mel Reeves is a 53 year old female seen with a history of calcium kidney stones with a history of hypercalciuria.  Does have osteogenesis imperfecta.  Patient most recently underwent PCNL and URS in 2019 for stone disease.      She has a history of hypercalciuria and has been on chlorthalidone 25 mg a day and potassium citrate 20 mill equivalents daily.  She had stopped the chlorthalidone from February 2 August after she had COVID but has restarted over the last 3 months.  She does not have trouble with the chlorthalidone from side effect profile but does have difficulty swallowing the potassium chloride pills.  Had potassium checked in October and was unremarkable  (Noted below).    No stone passage events, flank pain, or hematuria since last visit in July 2022.  No new imaging since 2022.    Metabolic:  Stone risk factors: Osteogenesis imperfecta  Blood work:  Potassium   Date Value Ref Range Status   10/02/2023 3.8 3.4 - 5.3 mmol/L Final   07/29/2022 3.6 3.4 - 5.3 mmol/L Final   05/21/2019 3.8 3.4 - 5.3 mmol/L Final     Creatinine   Date Value Ref Range Status   10/02/2023 0.65 0.51 - 0.95 mg/dL Final   05/21/2019 0.60 0.52 - 1.04 mg/dL Final     Calcium   Date Value Ref Range Status   10/02/2023 9.7 8.6 - 10.0 mg/dL Final   05/21/2019 7.8 (L) 8.5 - 10.1 mg/dL Final     Phosphorus   Date Value Ref Range Status   11/17/2021 3.4 2.5 - 4.5 mg/dL Final        24 hr urine study            Allergies:  Allergies   Allergen Reactions    Bactrim [Sulfamethoxazole-Trimethoprim] Hives    Sulfamethoxazole-Trimethoprim Hives    Sulfa Antibiotics Hives    Zithromax [Azithromycin] GI Disturbance            Medications:  Current Outpatient Medications   Medication Sig    chlorthalidone (HYGROTON) 25 MG tablet TAKE 1 TABLET BY MOUTH EVERY DAY    KLOR-CON 20 MEQ CR tablet TAKE 1 TABLET BY MOUTH  "DAILY    metoclopramide (REGLAN) 5 MG tablet Take 1 tablet (5 mg) by mouth 3 times daily as needed (nausea or headache) (Patient not taking: Reported on 12/11/2022)    SUMAtriptan (IMITREX) 50 MG tablet Take 50 mg by mouth at onset of headache for migraine (Patient not taking: Reported on 12/11/2022)     No current facility-administered medications for this visit.       Review of Systems:   ROS: 10 point ROS neg other than the symptoms noted above in the HPI.    Physical Exam:  B/P: Data Unavailable, T: Data Unavailable, P: Data Unavailable, R: Data Unavailable  Estimated body mass index is 21.97 kg/m  as calculated from the following:    Height as of 7/11/22: 1.638 m (5' 4.5\").    Weight as of 12/11/22: 59 kg (130 lb).  General: age-appropriate appearing female in NAD.  "

## 2023-10-31 NOTE — NURSING NOTE
Is the patient currently in the state of [place]? YES    Visit mode:VIDEO    If the visit is dropped, the patient can be reconnected by: TELEPHONE VISIT: Phone number:   Telephone Information:   Mobile [phone]       Will anyone else be joining the visit? NO  (If patient encounters technical issues they should call [phone]    How would you like to obtain your AVS? MyChart    Are changes needed to the allergy or medication list? Pt stated no med changes    Reason for visit: Video Visit (Follow-up  -restarting meds )    Lyric FUENTES

## 2023-11-10 ENCOUNTER — TELEPHONE (OUTPATIENT)
Dept: UROLOGY | Facility: CLINIC | Age: 53
End: 2023-11-10
Payer: COMMERCIAL

## 2023-11-10 NOTE — TELEPHONE ENCOUNTER
----- Message from Priscilla Hirsch sent at 10/31/2023  2:19 PM CDT -----  Regarding: CT and Lab now and 6 month follow up  Fasting BMP and CT now    6-month [facility] and follow-up visit    DA  10/31/23

## 2023-11-22 PROCEDURE — 88305 TISSUE EXAM BY PATHOLOGIST: CPT | Mod: TC,ORL | Performed by: PLASTIC SURGERY

## 2023-11-24 ENCOUNTER — LAB REQUISITION (OUTPATIENT)
Dept: LAB | Facility: CLINIC | Age: 53
End: 2023-11-24
Payer: COMMERCIAL

## 2023-11-27 LAB
PATH REPORT.COMMENTS IMP SPEC: NORMAL
PATH REPORT.COMMENTS IMP SPEC: NORMAL
PATH REPORT.FINAL DX SPEC: NORMAL
PATH REPORT.GROSS SPEC: NORMAL
PATH REPORT.MICROSCOPIC SPEC OTHER STN: NORMAL
PATH REPORT.RELEVANT HX SPEC: NORMAL
PHOTO IMAGE: NORMAL

## 2023-11-27 PROCEDURE — 88305 TISSUE EXAM BY PATHOLOGIST: CPT | Mod: 26 | Performed by: PATHOLOGY

## 2023-11-30 ENCOUNTER — ANCILLARY PROCEDURE (OUTPATIENT)
Dept: CT IMAGING | Facility: CLINIC | Age: 53
End: 2023-11-30
Attending: UROLOGY
Payer: COMMERCIAL

## 2023-11-30 DIAGNOSIS — N20.0 CALCULUS OF KIDNEY: ICD-10-CM

## 2023-11-30 PROCEDURE — 74176 CT ABD & PELVIS W/O CONTRAST: CPT

## 2024-02-04 DIAGNOSIS — N20.0 KIDNEY STONE ON LEFT SIDE: ICD-10-CM

## 2024-02-04 DIAGNOSIS — R82.994 HYPERCALCIURIA: ICD-10-CM

## 2024-02-12 ENCOUNTER — MYC MEDICAL ADVICE (OUTPATIENT)
Dept: UROLOGY | Facility: CLINIC | Age: 54
End: 2024-02-12
Payer: COMMERCIAL

## 2024-02-12 DIAGNOSIS — N20.0 CALCULUS OF KIDNEY: Primary | ICD-10-CM

## 2024-02-12 DIAGNOSIS — R82.994 HYPERCALCIURIA: ICD-10-CM

## 2024-02-12 RX ORDER — CHLORTHALIDONE 25 MG/1
25 TABLET ORAL DAILY
Qty: 90 TABLET | Refills: 0 | Status: SHIPPED | OUTPATIENT
Start: 2024-02-12 | End: 2024-05-14

## 2024-03-16 ENCOUNTER — HEALTH MAINTENANCE LETTER (OUTPATIENT)
Age: 54
End: 2024-03-16

## 2024-05-04 RX ORDER — CHLORTHALIDONE 25 MG/1
25 TABLET ORAL DAILY
Qty: 90 TABLET | Refills: 1 | OUTPATIENT
Start: 2024-05-04

## 2024-05-06 DIAGNOSIS — R82.994 HYPERCALCIURIA: ICD-10-CM

## 2024-05-14 ENCOUNTER — MYC REFILL (OUTPATIENT)
Dept: UROLOGY | Facility: CLINIC | Age: 54
End: 2024-05-14
Payer: COMMERCIAL

## 2024-05-14 DIAGNOSIS — R82.994 HYPERCALCIURIA: ICD-10-CM

## 2024-05-15 RX ORDER — CHLORTHALIDONE 25 MG/1
25 TABLET ORAL DAILY
Qty: 90 TABLET | Refills: 0 | Status: SHIPPED | OUTPATIENT
Start: 2024-05-15 | End: 2024-08-01

## 2024-08-01 ENCOUNTER — MYC REFILL (OUTPATIENT)
Dept: UROLOGY | Facility: CLINIC | Age: 54
End: 2024-08-01
Payer: COMMERCIAL

## 2024-08-01 DIAGNOSIS — R82.994 HYPERCALCIURIA: ICD-10-CM

## 2024-08-02 RX ORDER — CHLORTHALIDONE 25 MG/1
25 TABLET ORAL DAILY
Qty: 90 TABLET | Refills: 0 | Status: SHIPPED | OUTPATIENT
Start: 2024-08-02

## 2024-08-04 RX ORDER — CHLORTHALIDONE 25 MG/1
25 TABLET ORAL DAILY
Qty: 90 TABLET | Refills: 0 | OUTPATIENT
Start: 2024-08-04

## 2024-08-15 ENCOUNTER — TRANSFERRED RECORDS (OUTPATIENT)
Dept: HEALTH INFORMATION MANAGEMENT | Facility: CLINIC | Age: 54
End: 2024-08-15
Payer: COMMERCIAL

## 2024-08-18 ENCOUNTER — APPOINTMENT (OUTPATIENT)
Dept: MRI IMAGING | Facility: CLINIC | Age: 54
End: 2024-08-18
Attending: EMERGENCY MEDICINE
Payer: COMMERCIAL

## 2024-08-18 ENCOUNTER — HOSPITAL ENCOUNTER (EMERGENCY)
Facility: CLINIC | Age: 54
Discharge: HOME | End: 2024-08-18
Attending: EMERGENCY MEDICINE | Admitting: EMERGENCY MEDICINE
Payer: COMMERCIAL

## 2024-08-18 ENCOUNTER — APPOINTMENT (OUTPATIENT)
Dept: CT IMAGING | Facility: CLINIC | Age: 54
End: 2024-08-18
Attending: EMERGENCY MEDICINE
Payer: COMMERCIAL

## 2024-08-18 VITALS
RESPIRATION RATE: 16 BRPM | SYSTOLIC BLOOD PRESSURE: 105 MMHG | BODY MASS INDEX: 23.05 KG/M2 | HEIGHT: 64 IN | WEIGHT: 135 LBS | TEMPERATURE: 97.5 F | HEART RATE: 88 BPM | DIASTOLIC BLOOD PRESSURE: 65 MMHG | OXYGEN SATURATION: 98 %

## 2024-08-18 DIAGNOSIS — R20.2 PARESTHESIAS: ICD-10-CM

## 2024-08-18 LAB
ALBUMIN SERPL BCG-MCNC: 4.3 G/DL (ref 3.5–5.2)
ALP SERPL-CCNC: 61 U/L (ref 40–150)
ALT SERPL W P-5'-P-CCNC: 13 U/L (ref 0–50)
ANION GAP SERPL CALCULATED.3IONS-SCNC: 16 MMOL/L (ref 7–15)
AST SERPL W P-5'-P-CCNC: 28 U/L (ref 0–45)
ATRIAL RATE - MUSE: 90 BPM
BASOPHILS # BLD AUTO: 0 10E3/UL (ref 0–0.2)
BASOPHILS NFR BLD AUTO: 1 %
BILIRUB SERPL-MCNC: 0.4 MG/DL
BUN SERPL-MCNC: 7.6 MG/DL (ref 6–20)
CALCIUM SERPL-MCNC: 9.4 MG/DL (ref 8.8–10.4)
CHLORIDE SERPL-SCNC: 98 MMOL/L (ref 98–107)
CREAT SERPL-MCNC: 0.62 MG/DL (ref 0.51–0.95)
D DIMER PPP FEU-MCNC: 0.54 UG/ML FEU (ref 0–0.5)
DIASTOLIC BLOOD PRESSURE - MUSE: NORMAL MMHG
EGFRCR SERPLBLD CKD-EPI 2021: >90 ML/MIN/1.73M2
EOSINOPHIL # BLD AUTO: 0.1 10E3/UL (ref 0–0.7)
EOSINOPHIL NFR BLD AUTO: 1 %
ERYTHROCYTE [DISTWIDTH] IN BLOOD BY AUTOMATED COUNT: 12.3 % (ref 10–15)
GLUCOSE SERPL-MCNC: 108 MG/DL (ref 70–99)
HCO3 SERPL-SCNC: 21 MMOL/L (ref 22–29)
HCT VFR BLD AUTO: 43.1 % (ref 35–47)
HGB BLD-MCNC: 14.8 G/DL (ref 11.7–15.7)
HOLD SPECIMEN: NORMAL
IMM GRANULOCYTES # BLD: 0 10E3/UL
IMM GRANULOCYTES NFR BLD: 0 %
INTERPRETATION ECG - MUSE: NORMAL
LYMPHOCYTES # BLD AUTO: 1.4 10E3/UL (ref 0.8–5.3)
LYMPHOCYTES NFR BLD AUTO: 17 %
MCH RBC QN AUTO: 28.3 PG (ref 26.5–33)
MCHC RBC AUTO-ENTMCNC: 34.3 G/DL (ref 31.5–36.5)
MCV RBC AUTO: 82 FL (ref 78–100)
MONOCYTES # BLD AUTO: 0.4 10E3/UL (ref 0–1.3)
MONOCYTES NFR BLD AUTO: 5 %
NEUTROPHILS # BLD AUTO: 6.4 10E3/UL (ref 1.6–8.3)
NEUTROPHILS NFR BLD AUTO: 77 %
NRBC # BLD AUTO: 0 10E3/UL
NRBC BLD AUTO-RTO: 0 /100
P AXIS - MUSE: 65 DEGREES
PLATELET # BLD AUTO: 395 10E3/UL (ref 150–450)
POTASSIUM SERPL-SCNC: 4.3 MMOL/L (ref 3.4–5.3)
PR INTERVAL - MUSE: 136 MS
PROT SERPL-MCNC: 7.5 G/DL (ref 6.4–8.3)
QRS DURATION - MUSE: 78 MS
QT - MUSE: 358 MS
QTC - MUSE: 437 MS
R AXIS - MUSE: 36 DEGREES
RBC # BLD AUTO: 5.23 10E6/UL (ref 3.8–5.2)
SODIUM SERPL-SCNC: 135 MMOL/L (ref 135–145)
SYSTOLIC BLOOD PRESSURE - MUSE: NORMAL MMHG
T AXIS - MUSE: 46 DEGREES
TROPONIN T SERPL HS-MCNC: 9 NG/L
VENTRICULAR RATE- MUSE: 90 BPM
WBC # BLD AUTO: 8.3 10E3/UL (ref 4–11)

## 2024-08-18 PROCEDURE — 250N000011 HC RX IP 250 OP 636: Performed by: EMERGENCY MEDICINE

## 2024-08-18 PROCEDURE — 85048 AUTOMATED LEUKOCYTE COUNT: CPT | Performed by: EMERGENCY MEDICINE

## 2024-08-18 PROCEDURE — 71275 CT ANGIOGRAPHY CHEST: CPT

## 2024-08-18 PROCEDURE — 250N000009 HC RX 250: Performed by: EMERGENCY MEDICINE

## 2024-08-18 PROCEDURE — 36415 COLL VENOUS BLD VENIPUNCTURE: CPT | Performed by: EMERGENCY MEDICINE

## 2024-08-18 PROCEDURE — 99285 EMERGENCY DEPT VISIT HI MDM: CPT | Mod: 25 | Performed by: EMERGENCY MEDICINE

## 2024-08-18 PROCEDURE — A9585 GADOBUTROL INJECTION: HCPCS | Performed by: EMERGENCY MEDICINE

## 2024-08-18 PROCEDURE — 85379 FIBRIN DEGRADATION QUANT: CPT | Performed by: EMERGENCY MEDICINE

## 2024-08-18 PROCEDURE — 93005 ELECTROCARDIOGRAM TRACING: CPT | Performed by: EMERGENCY MEDICINE

## 2024-08-18 PROCEDURE — 70553 MRI BRAIN STEM W/O & W/DYE: CPT

## 2024-08-18 PROCEDURE — 255N000002 HC RX 255 OP 636: Performed by: EMERGENCY MEDICINE

## 2024-08-18 PROCEDURE — 80053 COMPREHEN METABOLIC PANEL: CPT | Performed by: EMERGENCY MEDICINE

## 2024-08-18 PROCEDURE — 84484 ASSAY OF TROPONIN QUANT: CPT | Performed by: EMERGENCY MEDICINE

## 2024-08-18 RX ORDER — GADOBUTROL 604.72 MG/ML
6 INJECTION INTRAVENOUS ONCE
Status: COMPLETED | OUTPATIENT
Start: 2024-08-18 | End: 2024-08-18

## 2024-08-18 RX ORDER — IOPAMIDOL 755 MG/ML
57 INJECTION, SOLUTION INTRAVASCULAR ONCE
Status: COMPLETED | OUTPATIENT
Start: 2024-08-18 | End: 2024-08-18

## 2024-08-18 RX ADMIN — IOPAMIDOL 57 ML: 755 INJECTION, SOLUTION INTRAVENOUS at 12:17

## 2024-08-18 RX ADMIN — SODIUM CHLORIDE 84 ML: 9 INJECTION, SOLUTION INTRAVENOUS at 12:18

## 2024-08-18 RX ADMIN — GADOBUTROL 6 ML: 604.72 INJECTION INTRAVENOUS at 14:18

## 2024-08-18 ASSESSMENT — COLUMBIA-SUICIDE SEVERITY RATING SCALE - C-SSRS
2. HAVE YOU ACTUALLY HAD ANY THOUGHTS OF KILLING YOURSELF IN THE PAST MONTH?: NO
6. HAVE YOU EVER DONE ANYTHING, STARTED TO DO ANYTHING, OR PREPARED TO DO ANYTHING TO END YOUR LIFE?: NO
1. IN THE PAST MONTH, HAVE YOU WISHED YOU WERE DEAD OR WISHED YOU COULD GO TO SLEEP AND NOT WAKE UP?: NO

## 2024-08-18 ASSESSMENT — ACTIVITIES OF DAILY LIVING (ADL)
ADLS_ACUITY_SCORE: 36
ADLS_ACUITY_SCORE: 36
ADLS_ACUITY_SCORE: 34
ADLS_ACUITY_SCORE: 36

## 2024-08-18 NOTE — ED PROVIDER NOTES
Emergency Department Note      History of Present Illness     Chief Complaint   Numbness      HPI   Mel Reeves is a 54 year old female who presents to the ED with numbness. She states that yesterday while at a  she developed bilateral hand and arm numbness and tingling that lasted roughly an hour and a half. She notes also having lightheadedness and nausea at the same time, and she felt like she might faint. She has not experienced symptoms such as these before. She denies chest pain, shortness of breath, neck pain, palpitations, vision change, fever, or cough. She did not have headache at the time though does have one now.  However she adds that she is prone to regular headaches, and has a history of migraines.  She states she also developed a mild sore throat yesterday. She denies known family history of heart attack or stroke. Patient notes that six days ago she returned home from a nine day vacation to the [place]     Independent Historian   None    Review of External Notes   None    Past Medical History     Medical History and Problem List   Kidney stones  Migraines  Migraines  Motion sickness  Osteogenesis imperfecta  PONV (postoperative nausea and vomiting)    Medications   Hygroton  Klor-con  Imitrex    Surgical History   Past Surgical History:   Procedure Laterality Date    ABDOMEN SURGERY      CS    APPENDECTOMY      with ovarian cyst removal    COMBINED CYSTOSCOPY, RETROGRADES, URETEROSCOPY, LASER HOLMIUM LITHOTRIPSY URETER(S), INSERT STENT Right 2019    Procedure: CYSTOSCOPY, RIGHT RETROGRADES, PYELOGRAM, RIGHT URETEROSCOPY, LASER HOLMIUM LITHOTRIPSY BASKET REMOVAL OF STONES AND RIGHT STENT PLACEMENT;  Surgeon: Warner Ramirez MD;  Location:  OR    CYSTOSCOPY      stone removal    CYSTOSCOPY, URETEROSCOPY, COMBINED Left 2019    Procedure: Cystoscopy, flexible  ureteroscopy, RETROGRADES, URETER STENT PLACENMENT.;  Surgeon: Warner Ramirez MD;  Location:  OR    GYN SURGERY       "ovarian cyst removal    IR RENAL STONE REMOVAL LEFT  5/20/2019    LASER HOLMIUM LITHOTRIPSY URETER(S), INSERT STENT, COMBINED Left 6/19/2019    Procedure: Cystoscopy, LEFT retrograde pyelogram, LEFT ureteral stent removal, LEFT ureteroscopy with laser lithotripsy, LEFT ureteral stent placement,  stone basketing with LEFT kidney stone removal;  Surgeon: Warner Ramirez MD;  Location:  OR    PERCUTANEOUS NEPHROLITHOTOMY Left 5/20/2019    Procedure: LEFT PERCUTANEOUS NEPHROLITHOTOMY, ,;  Surgeon: Warner Ramirez MD;  Location:  OR       Physical Exam     Patient Vitals for the past 24 hrs:   BP Temp Temp src Pulse Resp SpO2 Height Weight   08/18/24 1448 -- -- -- -- -- 98 % -- --   08/18/24 1447 105/65 -- -- 88 -- -- -- --   08/18/24 0926 127/64 97.5  F (36.4  C) Temporal 87 16 100 % 1.626 m (5' 4\") 61.2 kg (135 lb)     Physical Exam  Nursing note and vitals reviewed.  Constitutional:  Oriented to person, place, and time. Vital signs are normal. Cooperative.   HENT:   Mouth/Throat:   Oropharynx is clear and moist and mucous membranes are normal.   Eyes:    Conjunctivae are normal.      Pupils are equal, round, and reactive to light.   Neck:    Trachea normal and normal range of motion.   Cardiovascular:  Normal rate, regular rhythm and normal heart sounds.    Pulses:   Radial pulses are 2+ bilaterally. Dorsalis pedis pulses are 2+ bilaterally.   Pulmonary/Chest:  Effort normal.   Abdominal:   Soft. Normal appearance and bowel sounds are normal.      Exhibits no distension. There is no tenderness.      There is no rebound and no CVA tenderness.   Musculoskeletal:  Extremities atraumatic x 4.   Lymphadenopathy:  No cervical adenopathy.   Neurological:   Alert and oriented to person, place, and time. Normal strength and normal reflexes. Not disoriented. No cranial nerve deficit or sensory deficit. Displays a negative Romberg sign. Coordination and gait normal. GCS eye subscore is 4. GCS verbal subscore is 5. GCS motor " subscore is 6. Visual fields intact. No pronator drift. Normal Finger-to-Nose and Rapid Alternating Movement. Normal Heel-to-shin.   Skin:    Skin is warm, dry and intact.      No rash noted.   Psychiatric:   Normal mood and affect. Speech is normal and behavior is normal. Judgment normal. Cognition and memory are normal.      Diagnostics     Lab Results   Labs Ordered and Resulted from Time of ED Arrival to Time of ED Departure   D DIMER QUANTITATIVE - Abnormal       Result Value    D-Dimer Quantitative 0.54 (*)    COMPREHENSIVE METABOLIC PANEL - Abnormal    Sodium 135      Potassium 4.3      Carbon Dioxide (CO2) 21 (*)     Anion Gap 16 (*)     Urea Nitrogen 7.6      Creatinine 0.62      GFR Estimate >90      Calcium 9.4      Chloride 98      Glucose 108 (*)     Alkaline Phosphatase 61      AST 28      ALT 13      Protein Total 7.5      Albumin 4.3      Bilirubin Total 0.4     CBC WITH PLATELETS AND DIFFERENTIAL - Abnormal    WBC Count 8.3      RBC Count 5.23 (*)     Hemoglobin 14.8      Hematocrit 43.1      MCV 82      MCH 28.3      MCHC 34.3      RDW 12.3      Platelet Count 395      % Neutrophils 77      % Lymphocytes 17      % Monocytes 5      % Eosinophils 1      % Basophils 1      % Immature Granulocytes 0      NRBCs per 100 WBC 0      Absolute Neutrophils 6.4      Absolute Lymphocytes 1.4      Absolute Monocytes 0.4      Absolute Eosinophils 0.1      Absolute Basophils 0.0      Absolute Immature Granulocytes 0.0      Absolute NRBCs 0.0     TROPONIN T, HIGH SENSITIVITY - Normal    Troponin T, High Sensitivity 9       ECG  ECG taken at 1228, ECG read at 1245  Normal sinus rhythm with sinus arrhythmia  Possible anterior infarct, age undetermined  Abnormal ECG   Rate 90 bpm. MA interval 136 ms. QRS duration 78 ms. QT/QTc 358/437 ms. P-R-T axes 65 36 46.     Imaging   MR Brain w/o & w Contrast   Final Result   IMPRESSION:   1.  No acute intracranial abnormality.   2.  Several scattered punctate foci of T2  prolongation involving the bilateral cerebral white matter, nonspecific although can be seen in the setting of chronic microvascular ischemia, demyelinating disease, migraine disorder, among other etiologies.         CT Chest Pulmonary Embolism w Contrast   Final Result   IMPRESSION:   1.  No evidence for pulmonary embolism.   2.  Lungs are clear.   3.  Nonobstructing calculi in the left kidney.   4.  Multiple chronic-appearing compression deformities in the thoracic spine.           Independent Interpretation   None    ED Course      Medications Administered   Medications   Saline flush (84 mLs Intravenous $Given 8/18/24 1218)   iopamidol (ISOVUE-370) solution 57 mL (57 mLs Intravenous $Given 8/18/24 1217)   gadobutrol (GADAVIST) injection 6 mL (6 mLs Intravenous $Given 8/18/24 1418)       Procedures   Procedures     Discussion of Management   None    ED Course   ED Course as of 08/18/24 1617   Sun Aug 18, 2024   1120 I obtained history and examined the patient as noted above.   1209 I rechecked the patient and explained findings.     Additional Documentation  None    Medical Decision Making / Diagnosis     CMS Diagnoses: None    MIPS    CT for PE was ordered because the patient had an abnormal d-dimer.    GERMÁN   Mel Reeves is a 54 year old female who came in for further evaluation of an episode of paresthesias yesterday.  She actually has an essentially normal exam here today, and my suspicion for something more serious such as a stroke or TIA was quite low given the symmetrical nature of her symptoms.  I considered other potential causes such as a PE given her recent travel, other obscure neurologic disorders, or possibly a dissection.  Again, my suspicion for serious pathology was on the low side though given the fact that she has not had symptoms today.  However I felt it was still reasonable to proceed with the above workup, and when her D-dimer came back elevated, she then had a CT scan of her chest as  well, which was unremarkable.  She already knows about her compression fractures and the kidney stone.  The MRI of her brain does not appear to show anything significant either, and the findings on the MRI are likely consistent with her history of migraines.  Regardless, at this point I feel that she is safe for discharge and outpatient management.  I recommended close outpatient follow-up and certainly returning with any concerns or worsening symptoms.    Disposition   The patient was discharged.     Diagnosis     ICD-10-CM    1. Paresthesias  R20.2            Discharge Medications   Discharge Medication List as of 8/18/2024  2:57 PM        Scribe Disclosure:  I, SANDY COLLIER, am serving as a scribe at 11:27 AM on 8/18/2024 to document services personally performed by Wilfred Dewey MD based on my observations and the provider's statements to me.      Wilfred Dewey MD  08/18/24 [id number]

## 2024-08-18 NOTE — ED TRIAGE NOTES
Yesterday noted bilateral hand and arm tingling and difficulty moving them with lightheadedness for 1-2hours. Was at  when it happened. Completely resolved now.

## 2024-09-18 ENCOUNTER — MYC REFILL (OUTPATIENT)
Dept: UROLOGY | Facility: CLINIC | Age: 54
End: 2024-09-18
Payer: COMMERCIAL

## 2024-09-18 DIAGNOSIS — N20.0 CALCULUS OF KIDNEY: ICD-10-CM

## 2024-09-18 RX ORDER — POTASSIUM CHLORIDE 1.5 G/1.58G
20 POWDER, FOR SOLUTION ORAL DAILY
Qty: 30 PACKET | Refills: 11 | Status: SHIPPED | OUTPATIENT
Start: 2024-09-18

## 2024-10-28 DIAGNOSIS — R82.994 HYPERCALCIURIA: ICD-10-CM

## 2024-11-27 ENCOUNTER — MYC REFILL (OUTPATIENT)
Dept: UROLOGY | Facility: CLINIC | Age: 54
End: 2024-11-27
Payer: COMMERCIAL

## 2024-11-27 DIAGNOSIS — R82.994 HYPERCALCIURIA: ICD-10-CM

## 2024-11-27 RX ORDER — CHLORTHALIDONE 25 MG/1
25 TABLET ORAL DAILY
Qty: 90 TABLET | Refills: 0 | Status: SHIPPED | OUTPATIENT
Start: 2024-11-27

## 2024-12-24 ENCOUNTER — OFFICE VISIT (OUTPATIENT)
Dept: UROLOGY | Facility: CLINIC | Age: 54
End: 2024-12-24
Payer: COMMERCIAL

## 2024-12-24 VITALS
SYSTOLIC BLOOD PRESSURE: 107 MMHG | BODY MASS INDEX: 22.88 KG/M2 | WEIGHT: 134 LBS | OXYGEN SATURATION: 100 % | DIASTOLIC BLOOD PRESSURE: 71 MMHG | HEIGHT: 64 IN | HEART RATE: 77 BPM

## 2024-12-24 DIAGNOSIS — R82.994 HYPERCALCIURIA: Primary | ICD-10-CM

## 2024-12-24 DIAGNOSIS — N20.0 CALCULUS OF KIDNEY: ICD-10-CM

## 2024-12-24 PROCEDURE — 99214 OFFICE O/P EST MOD 30 MIN: CPT | Performed by: UROLOGY

## 2024-12-24 RX ORDER — METHYLPHENIDATE HYDROCHLORIDE 5 MG/1
5 TABLET ORAL
COMMUNITY
Start: 2024-12-13

## 2024-12-24 RX ORDER — METHYLPHENIDATE HYDROCHLORIDE 18 MG/1
18 TABLET, EXTENDED RELEASE ORAL
COMMUNITY
Start: 2024-12-13

## 2024-12-24 RX ORDER — METHYLPHENIDATE HYDROCHLORIDE 18 MG/1
TABLET ORAL
COMMUNITY
Start: 2024-02-01

## 2024-12-24 NOTE — PROGRESS NOTES
Name: Mel Reeves   MRN: [id number]  YOB: 1970    Assessment and Plan:  54 year old female with persistent hypercalciuria.  She does have osteogenesis imperfecta which is likely etiology for her hypercalciuria.  Urine volume is significantly increased which is likely improving her stone supersaturations.    1.  Mixed calcium nephrolithiasis  2.  Left lower pole nephrolithiasis, stable  3.  Hypercalciuria, worse  4.  High urine sodium  5.  Low urine volume, resolved  6.  Dietary management counseling      Congratulated her on her increase in fluid intake as this is having a big effect on lowering her stone supersaturation'did  to try and watch her sodium as this often increases with fluid intake and can worsen hypercalciuria.    Regarding hypercalciuria, is not much effect on the chlorthalidone.  Could be that this is primarily due to osteogenesis imperfecta and that the chlorthalidone is minimally effective for this.  Will have her do a trial of stopping the chlorthalidone to see if her hypercalciuria worsens.  If it does not, could consider having her come off the chlorthalidone and potassium chloride.      Also discussed her vitamin C usage.  Ideally should be closer to 500 mg a day instead of at 1000 mg as this is a risk factor for calcium oxalate stone disease.    Regarding bisphosphonate use, I believe this likely have a positive impact on reducing her urine calcium, but given the stability of her stones would not recommend this out right unless was recommended by endocrinology.    Plan:  - Reduce vitamin C daily intake to 500 mg  - Patient will complete a Litholink and message us when the results are in  - Stop chlorthalidone and potassium citrate for the time being    Tony Gonzalez MD  December 24, 2024         Chief Complaint: Stone follow-up    History of Present Illness:  Mel Reeves is a 54 year old female seen with a history of osteogenesis imperfecta and mixed calcium stones  stones.  She went right ureteroscopy for a symptomatic right ureteral calculus in 2019.  At that time she was found to have large left nephrolithiasis underwent a left PCNL and secondary ureteroscopy.  She had some left lower pole residual nephrolithiasis greater than 1 cm that we have been observing.  She did have hypercalciuria which she is taking chlorthalidone 25 mg a day with potassium chloride as well.  She has not had any side effects from the chlorthalidone.  She has been on bisphosphonates in the past due to osteopenia from her osteogenesis imperfecta but not currently on any bisphosphonates.  She has had a negative parathyroid hormone check in the past.  Not currently take any vitamin D.    Since her last visit she has not had any hematuria, flank pain or other concerning symptoms or signs for nephrolithiasis.  She is taking about 1000 mg of vitamin C daily for immune system health since the fall as she is a .    I reviewed her CT from August 18, 2024 of the chest which did go down to most of the kidneys which demonstrated roughly stable left lower pole nephrolithiasis.    I reviewed her 24-hour urine study from August 15, 2024 which demonstrated worsened hypercalciuria but also elevation in urine sodium.  Her fluid intake was markedly improved and calcium oxalate supersaturation was decreased.         Allergies:  Allergies   Allergen Reactions    Bactrim [Sulfamethoxazole-Trimethoprim] Hives    Sulfamethoxazole-Trimethoprim Hives    Sulfa Antibiotics Hives    Zithromax [Azithromycin] GI Disturbance            Medications:  Current Outpatient Medications   Medication Sig Dispense Refill    chlorthalidone (HYGROTON) 25 MG tablet Take 1 tablet (25 mg) by mouth daily. 90 tablet 0    chlorthalidone (HYGROTON) 25 MG tablet TAKE 1 TABLET BY MOUTH EVERY DAY 90 tablet 1    methylphenidate (RITALIN) 5 MG tablet Take 5 mg by mouth.      methylphenidate HCl ER, non-OSM, 18 MG 24H tablet Take 18 mg  "by mouth.      methylphenidate HCl ER, OSM, (CONCERTA) 18 MG CR tablet       potassium chloride (KLOR-CON) 20 MEQ packet Take 20 mEq by mouth daily. 30 packet 11    SUMAtriptan (IMITREX) 50 MG tablet Take 50 mg by mouth at onset of headache for migraine (Patient not taking: Reported on 12/11/2022)       No current facility-administered medications for this visit.       Physical Exam:  B/P: 107/71, T: Data Unavailable, P: 77, R: Data Unavailable  Estimated body mass index is 23 kg/m  as calculated from the following:    Height as of this encounter: 1.626 m (5' 4\").    Weight as of this encounter: 60.8 kg (134 lb).  General: age-appropriate appearing female in NAD.  "

## 2024-12-24 NOTE — LETTER
12/24/2024       RE: Mel Reeves  [address] [facility] [address] [id number]     Dear Colleague,    Thank you for referring your patient, Mel Reeves, to the [facility] at [facility]. Please see a copy of my visit note below.    Name: Mel Reeves   MRN: [id number]  YOB: 1970    Assessment and Plan:  54 year old female with persistent hypercalciuria.  She does have osteogenesis imperfecta which is likely etiology for her hypercalciuria.  Urine volume is significantly increased which is likely improving her stone supersaturations.    1.  Mixed calcium nephrolithiasis  2.  Left lower pole nephrolithiasis, stable  3.  Hypercalciuria, worse  4.  High urine sodium  5.  Low urine volume, resolved  6.  Dietary management counseling      Congratulated her on her increase in fluid intake as this is having a big effect on lowering her stone supersaturation'did  to try and watch her sodium as this often increases with fluid intake and can worsen hypercalciuria.    Regarding hypercalciuria, is not much effect on the chlorthalidone.  Could be that this is primarily due to osteogenesis imperfecta and that the chlorthalidone is minimally effective for this.  Will have her do a trial of stopping the chlorthalidone to see if her hypercalciuria worsens.  If it does not, could consider having her come off the chlorthalidone and potassium chloride.      Also discussed her vitamin C usage.  Ideally should be closer to 500 mg a day instead of at 1000 mg as this is a risk factor for calcium oxalate stone disease.    Regarding bisphosphonate use, I believe this likely have a positive impact on reducing her urine calcium, but given the stability of her stones would not recommend this out right unless was recommended by endocrinology.    Plan:  - Reduce vitamin C daily intake to 500 mg  - Patient will complete a Litholink and message us when the  results are in  - Stop chlorthalidone and potassium citrate for the time being    Tony Gonzalez MD  December 24, 2024         Chief Complaint: Stone follow-up    History of Present Illness:  Mel Reeves is a 54 year old female seen with a history of osteogenesis imperfecta and mixed calcium stones stones.  She went right ureteroscopy for a symptomatic right ureteral calculus in 2019.  At that time she was found to have large left nephrolithiasis underwent a left PCNL and secondary ureteroscopy.  She had some left lower pole residual nephrolithiasis greater than 1 cm that we have been observing.  She did have hypercalciuria which she is taking chlorthalidone 25 mg a day with potassium chloride as well.  She has not had any side effects from the chlorthalidone.  She has been on bisphosphonates in the past due to osteopenia from her osteogenesis imperfecta but not currently on any bisphosphonates.  She has had a negative parathyroid hormone check in the past.  Not currently take any vitamin D.    Since her last visit she has not had any hematuria, flank pain or other concerning symptoms or signs for nephrolithiasis.  She is taking about 1000 mg of vitamin C daily for immune system health since the fall as she is a .    I reviewed her CT from August 18, 2024 of the chest which did go down to most of the kidneys which demonstrated roughly stable left lower pole nephrolithiasis.    I reviewed her 24-hour urine study from August 15, 2024 which demonstrated worsened hypercalciuria but also elevation in urine sodium.  Her fluid intake was markedly improved and calcium oxalate supersaturation was decreased.         Allergies:  Allergies   Allergen Reactions     Bactrim [Sulfamethoxazole-Trimethoprim] Hives     Sulfamethoxazole-Trimethoprim Hives     Sulfa Antibiotics Hives     Zithromax [Azithromycin] GI Disturbance            Medications:  Current Outpatient Medications   Medication Sig Dispense Refill  "    chlorthalidone (HYGROTON) 25 MG tablet Take 1 tablet (25 mg) by mouth daily. 90 tablet 0     chlorthalidone (HYGROTON) 25 MG tablet TAKE 1 TABLET BY MOUTH EVERY DAY 90 tablet 1     methylphenidate (RITALIN) 5 MG tablet Take 5 mg by mouth.       methylphenidate HCl ER, non-OSM, 18 MG 24H tablet Take 18 mg by mouth.       methylphenidate HCl ER, OSM, (CONCERTA) 18 MG CR tablet        potassium chloride (KLOR-CON) 20 MEQ packet Take 20 mEq by mouth daily. 30 packet 11     SUMAtriptan (IMITREX) 50 MG tablet Take 50 mg by mouth at onset of headache for migraine (Patient not taking: Reported on 12/11/2022)       No current facility-administered medications for this visit.       Physical Exam:  B/P: 107/71, T: Data Unavailable, P: 77, R: Data Unavailable  Estimated body mass index is 23 kg/m  as calculated from the following:    Height as of this encounter: 1.626 m (5' 4\").    Weight as of this encounter: 60.8 kg (134 lb).  General: age-appropriate appearing female in NAD.      Again, thank you for allowing me to participate in the care of your patient.      Sincerely,    Tony Gonzalez MD    "

## 2024-12-24 NOTE — NURSING NOTE
Chief Complaint   Patient presents with    hx stones    No blood in the urine   Ding well   Chong Saul, CMA

## 2025-01-26 RX ORDER — CHLORTHALIDONE 25 MG/1
25 TABLET ORAL DAILY
Qty: 90 TABLET | Refills: 0 | OUTPATIENT
Start: 2025-01-26

## 2025-08-14 LAB
AMMONIA 24H UR-SRATE: 32 MMOL/24 HR
CA H2 PHOS DIHYD 24H SATFR UR: 1.51
CALCIUM 24H UR-MRATE: 275 MG/24 HR
CALCIUM/CREAT 24H UR: 266 MG/G CREAT
CAOX INDEX 24H UR-RTO: 3.87
CHLORIDE 24H UR-SRATE: 174 MMOL/24 HR
CITRATE 24H UR-MRATE: 1064 MG/24 HR
CREAT 24H UR-MRATE: 1036 MG/24 HR
CREAT/BW 24H UR-RELMRAT: 16.7 MG/24 HR/KG
CYSTINE 24H UR QL: ABNORMAL
Lab: 1.1 G/KG/24 HR
Lab: 4.4 MG/24 HR/KG
Lab: ABNORMAL
MAGNESIUM 24H UR-MRATE: 90 MG/24 HR
OXALATE 24H UR-MRATE: 37 MG/24 HR
PH 24H UR: 6.84 [PH]
PHOSPHATE 24H UR-MRATE: 1010 MG/24 HR
POTASSIUM 24H UR-SRATE: 80 MMOL/24 HR
SODIUM 24H UR-SRATE: 185 MMOL/24 HR
SPECIMEN VOL 24H UR: 3540 ML/24 HR
SULFATE 24H UR-SRATE: 33 MEQ/24 HR
URATE 24H SATFR UR: 0.07
URATE 24H UR-MRATE: 704 MG/24 HR
UUN 24H UR-MRATE: 9.53 G/24 HR

## (undated) DEVICE — GLOVE PROTEXIS MICRO 7.5  2D73PM75

## (undated) DEVICE — LIGHT HANDLE X2

## (undated) DEVICE — PACK CYSTOSCOPY SBA15CYFSI

## (undated) DEVICE — ADJUSTABLE BIOPSY PORT SEAL

## (undated) DEVICE — SOL NACL 0.9% IRRIG 3000ML BAG 2B7477

## (undated) DEVICE — DEVICE MULTI TORQUE TD01

## (undated) DEVICE — CATH URETERAL FLEX TIP TIGERTAIL 06FRX70CM 139006

## (undated) DEVICE — RAD RX ISOVUE 300 (50ML) 61% IOPAMIDOL CHARGE PER ML

## (undated) DEVICE — WIRE GUIDE AMPLATZ SUPER STIFF 0.035"X145CM 46-524

## (undated) DEVICE — PAD CHUX UNDERPAD 23X24" 7136

## (undated) DEVICE — CATH TRAY FOLEY SURESTEP 16FR WDRAIN BAG STLK LATEX A300316A

## (undated) DEVICE — GLOVE PROTEXIS BLUE W/NEU-THERA 8.0  2D73EB80

## (undated) DEVICE — PREP SKIN SCRUB TRAY 4461A

## (undated) DEVICE — DRAPE SHEET REV FOLD 3/4 9349

## (undated) DEVICE — TUBING EXTENSION SET MICROBORE 20" 4620-02

## (undated) DEVICE — GUIDEWIRE SENSOR DUAL FLEX STR 0.035"X150CM M0066703080

## (undated) DEVICE — BASKET NITINOL TIPLESS HALO  1.5FRX120CM 554120

## (undated) DEVICE — PROBE SET CYBERWAND LITHO GYRUS RENAL/BLADDER CW-RBP

## (undated) DEVICE — SU SILK 0 24" TIE SA76G

## (undated) DEVICE — SOL WATER IRRIG 1000ML BOTTLE 2F7114

## (undated) DEVICE — TUBING SUCTION 12"X1/4" N612

## (undated) DEVICE — SHEATH URETERAL ACCESS NAVIGATOR 13/15FRX36CM 250-209

## (undated) DEVICE — DRAPE LEGGINGS 8421

## (undated) DEVICE — DRSG GAUZE 4X4" 3033

## (undated) DEVICE — SOL NACL 0.9% IRRIG 1500ML BOTTLE 07138-36

## (undated) DEVICE — PUMP SYSTEM SINGLE ACTION M0067201000

## (undated) DEVICE — Device

## (undated) DEVICE — TAPE DRSG UNIVERSAL CLOTH 3" WHITE LATEX 881-3

## (undated) DEVICE — DRAPE NEURO TIBURON W/XL FLUID CONTROL POUCH

## (undated) DEVICE — INTRO KIT ACUSTICK II 21GA .018-.038" 20-703

## (undated) DEVICE — SYR 50ML LL W/O NDL 309653

## (undated) DEVICE — TUBING IRRIG TUR Y TYPE 96" LF 6543-01

## (undated) DEVICE — LASER FIBER HOLMIUM FLEXIVA 200UM M0068403910 840-391

## (undated) DEVICE — WIPES FOLEY CARE SURESTEP PROVON DFC100

## (undated) DEVICE — CATH URETERAL DUAL LUMEN 10FRX54CM M0064051000

## (undated) DEVICE — CATH URETERAL OPEN END 6FR AXXCESS

## (undated) DEVICE — MANIFOLD NEPTUNE 4 PORT 700-20

## (undated) DEVICE — GOWN XXLG REINFORCED 9071EL

## (undated) DEVICE — GUIDEWIRE AMPLATZ .035X180CM STIFF G27034

## (undated) DEVICE — CATH FOLEY 18FR 5ML SILVER COAT SIL LUBRISIL 1758SI18

## (undated) DEVICE — RAD NDL TROCAR 18GAX15CM G00945

## (undated) DEVICE — LINEN TOWEL PACK X5 5464

## (undated) DEVICE — TUBING IRRIG CYSTO/BLADDER SET 81" LF 2C4040

## (undated) DEVICE — PREP DURAPREP 26ML APL 8630

## (undated) DEVICE — SHEATH URETERAL ACCESS NAVIGATOR HD 11/13FRX36CM M0062502220

## (undated) DEVICE — CATH BALLOON DILATION X FORCE 30FR 10MMX15CM 996101

## (undated) DEVICE — SU PROLENE 2-0 CT-2 30" 8411H

## (undated) DEVICE — PACK SPECIAL PROCEDURE CUSTOM

## (undated) RX ORDER — FUROSEMIDE 10 MG/ML
INJECTION INTRAMUSCULAR; INTRAVENOUS
Status: DISPENSED
Start: 2019-06-19

## (undated) RX ORDER — ONDANSETRON 2 MG/ML
INJECTION INTRAMUSCULAR; INTRAVENOUS
Status: DISPENSED
Start: 2019-05-20

## (undated) RX ORDER — FENTANYL CITRATE 50 UG/ML
INJECTION, SOLUTION INTRAMUSCULAR; INTRAVENOUS
Status: DISPENSED
Start: 2019-05-20

## (undated) RX ORDER — ONDANSETRON 2 MG/ML
INJECTION INTRAMUSCULAR; INTRAVENOUS
Status: DISPENSED
Start: 2019-04-08

## (undated) RX ORDER — GLYCOPYRROLATE 0.2 MG/ML
INJECTION, SOLUTION INTRAMUSCULAR; INTRAVENOUS
Status: DISPENSED
Start: 2019-05-20

## (undated) RX ORDER — DIPHENHYDRAMINE HYDROCHLORIDE 50 MG/ML
INJECTION INTRAMUSCULAR; INTRAVENOUS
Status: DISPENSED
Start: 2019-04-08

## (undated) RX ORDER — KETOROLAC TROMETHAMINE 30 MG/ML
INJECTION, SOLUTION INTRAMUSCULAR; INTRAVENOUS
Status: DISPENSED
Start: 2019-04-08

## (undated) RX ORDER — VECURONIUM BROMIDE 1 MG/ML
INJECTION, POWDER, LYOPHILIZED, FOR SOLUTION INTRAVENOUS
Status: DISPENSED
Start: 2019-05-20

## (undated) RX ORDER — LIDOCAINE HYDROCHLORIDE 20 MG/ML
INJECTION, SOLUTION EPIDURAL; INFILTRATION; INTRACAUDAL; PERINEURAL
Status: DISPENSED
Start: 2019-04-08

## (undated) RX ORDER — PROPOFOL 10 MG/ML
INJECTION, EMULSION INTRAVENOUS
Status: DISPENSED
Start: 2019-06-19

## (undated) RX ORDER — CEFAZOLIN SODIUM 2 G/100ML
INJECTION, SOLUTION INTRAVENOUS
Status: DISPENSED
Start: 2019-05-20

## (undated) RX ORDER — SCOLOPAMINE TRANSDERMAL SYSTEM 1 MG/1
PATCH, EXTENDED RELEASE TRANSDERMAL
Status: DISPENSED
Start: 2019-05-20

## (undated) RX ORDER — SCOLOPAMINE TRANSDERMAL SYSTEM 1 MG/1
PATCH, EXTENDED RELEASE TRANSDERMAL
Status: DISPENSED
Start: 2019-06-19

## (undated) RX ORDER — CEFAZOLIN SODIUM 2 G/100ML
INJECTION, SOLUTION INTRAVENOUS
Status: DISPENSED
Start: 2019-06-19

## (undated) RX ORDER — DEXAMETHASONE SODIUM PHOSPHATE 4 MG/ML
INJECTION, SOLUTION INTRA-ARTICULAR; INTRALESIONAL; INTRAMUSCULAR; INTRAVENOUS; SOFT TISSUE
Status: DISPENSED
Start: 2019-06-19

## (undated) RX ORDER — LIDOCAINE HYDROCHLORIDE 20 MG/ML
INJECTION, SOLUTION EPIDURAL; INFILTRATION; INTRACAUDAL; PERINEURAL
Status: DISPENSED
Start: 2019-06-19

## (undated) RX ORDER — FUROSEMIDE 10 MG/ML
INJECTION INTRAMUSCULAR; INTRAVENOUS
Status: DISPENSED
Start: 2019-05-20

## (undated) RX ORDER — PROPOFOL 10 MG/ML
INJECTION, EMULSION INTRAVENOUS
Status: DISPENSED
Start: 2019-05-20

## (undated) RX ORDER — PROPOFOL 10 MG/ML
INJECTION, EMULSION INTRAVENOUS
Status: DISPENSED
Start: 2019-04-08

## (undated) RX ORDER — DEXAMETHASONE SODIUM PHOSPHATE 4 MG/ML
INJECTION, SOLUTION INTRA-ARTICULAR; INTRALESIONAL; INTRAMUSCULAR; INTRAVENOUS; SOFT TISSUE
Status: DISPENSED
Start: 2019-05-20

## (undated) RX ORDER — ONDANSETRON 2 MG/ML
INJECTION INTRAMUSCULAR; INTRAVENOUS
Status: DISPENSED
Start: 2019-06-19

## (undated) RX ORDER — NEOSTIGMINE METHYLSULFATE 1 MG/ML
VIAL (ML) INJECTION
Status: DISPENSED
Start: 2019-05-20

## (undated) RX ORDER — FUROSEMIDE 10 MG/ML
INJECTION INTRAMUSCULAR; INTRAVENOUS
Status: DISPENSED
Start: 2019-04-08

## (undated) RX ORDER — FENTANYL CITRATE 50 UG/ML
INJECTION, SOLUTION INTRAMUSCULAR; INTRAVENOUS
Status: DISPENSED
Start: 2019-04-08

## (undated) RX ORDER — LIDOCAINE HYDROCHLORIDE 20 MG/ML
INJECTION, SOLUTION EPIDURAL; INFILTRATION; INTRACAUDAL; PERINEURAL
Status: DISPENSED
Start: 2019-05-20

## (undated) RX ORDER — CEFAZOLIN SODIUM 1 G/3ML
INJECTION, POWDER, FOR SOLUTION INTRAMUSCULAR; INTRAVENOUS
Status: DISPENSED
Start: 2019-05-20

## (undated) RX ORDER — FENTANYL CITRATE 50 UG/ML
INJECTION, SOLUTION INTRAMUSCULAR; INTRAVENOUS
Status: DISPENSED
Start: 2019-06-19

## (undated) RX ORDER — DEXAMETHASONE SODIUM PHOSPHATE 4 MG/ML
INJECTION, SOLUTION INTRA-ARTICULAR; INTRALESIONAL; INTRAMUSCULAR; INTRAVENOUS; SOFT TISSUE
Status: DISPENSED
Start: 2019-04-08

## (undated) RX ORDER — CEFAZOLIN SODIUM 2 G/100ML
INJECTION, SOLUTION INTRAVENOUS
Status: DISPENSED
Start: 2019-04-08